# Patient Record
Sex: FEMALE | Race: WHITE | NOT HISPANIC OR LATINO | Employment: OTHER | URBAN - METROPOLITAN AREA
[De-identification: names, ages, dates, MRNs, and addresses within clinical notes are randomized per-mention and may not be internally consistent; named-entity substitution may affect disease eponyms.]

---

## 2023-08-30 DIAGNOSIS — I10 ESSENTIAL (PRIMARY) HYPERTENSION: ICD-10-CM

## 2023-08-30 RX ORDER — METOPROLOL SUCCINATE 50 MG/1
50 TABLET, EXTENDED RELEASE ORAL DAILY
Qty: 90 TABLET | Refills: 0 | Status: SHIPPED | OUTPATIENT
Start: 2023-08-30 | End: 2023-09-15 | Stop reason: SDUPTHER

## 2023-09-15 DIAGNOSIS — I10 ESSENTIAL (PRIMARY) HYPERTENSION: ICD-10-CM

## 2023-09-15 DIAGNOSIS — K21.9 GASTROESOPHAGEAL REFLUX DISEASE, UNSPECIFIED WHETHER ESOPHAGITIS PRESENT: ICD-10-CM

## 2023-09-15 RX ORDER — OMEPRAZOLE 40 MG/1
1 CAPSULE, DELAYED RELEASE ORAL DAILY
COMMUNITY
Start: 2018-02-23 | End: 2023-09-15 | Stop reason: SDUPTHER

## 2023-09-18 DIAGNOSIS — E55.9 VITAMIN D DEFICIENCY: ICD-10-CM

## 2023-09-18 DIAGNOSIS — E78.5 DYSLIPIDEMIA, GOAL LDL BELOW 130: Primary | ICD-10-CM

## 2023-09-18 RX ORDER — OMEPRAZOLE 40 MG/1
40 CAPSULE, DELAYED RELEASE ORAL DAILY
Qty: 90 CAPSULE | Refills: 0 | Status: SHIPPED | OUTPATIENT
Start: 2023-09-18 | End: 2024-01-08

## 2023-09-18 RX ORDER — METOPROLOL SUCCINATE 50 MG/1
50 TABLET, EXTENDED RELEASE ORAL DAILY
Qty: 90 TABLET | Refills: 0 | Status: SHIPPED | OUTPATIENT
Start: 2023-09-18 | End: 2024-04-08

## 2023-10-24 DIAGNOSIS — R92.8 ABNORMAL MAMMOGRAM: ICD-10-CM

## 2023-10-28 ENCOUNTER — LAB (OUTPATIENT)
Dept: LAB | Facility: LAB | Age: 78
End: 2023-10-28
Payer: MEDICARE

## 2023-10-28 DIAGNOSIS — E78.5 DYSLIPIDEMIA, GOAL LDL BELOW 130: ICD-10-CM

## 2023-10-28 DIAGNOSIS — E55.9 VITAMIN D DEFICIENCY: ICD-10-CM

## 2023-10-28 LAB
25(OH)D3 SERPL-MCNC: 35 NG/ML (ref 30–100)
ALBUMIN SERPL BCP-MCNC: 4.2 G/DL (ref 3.4–5)
ALP SERPL-CCNC: 73 U/L (ref 33–136)
ALT SERPL W P-5'-P-CCNC: 10 U/L (ref 7–45)
ANION GAP SERPL CALC-SCNC: 12 MMOL/L (ref 10–20)
AST SERPL W P-5'-P-CCNC: 16 U/L (ref 9–39)
BASOPHILS # BLD AUTO: 0.05 X10*3/UL (ref 0–0.1)
BASOPHILS NFR BLD AUTO: 1.3 %
BILIRUB SERPL-MCNC: 0.7 MG/DL (ref 0–1.2)
BUN SERPL-MCNC: 14 MG/DL (ref 6–23)
CALCIUM SERPL-MCNC: 9.4 MG/DL (ref 8.6–10.3)
CHLORIDE SERPL-SCNC: 102 MMOL/L (ref 98–107)
CHOLEST SERPL-MCNC: 205 MG/DL (ref 0–199)
CHOLESTEROL/HDL RATIO: 3
CO2 SERPL-SCNC: 28 MMOL/L (ref 21–32)
CREAT SERPL-MCNC: 0.76 MG/DL (ref 0.5–1.05)
EOSINOPHIL # BLD AUTO: 0.24 X10*3/UL (ref 0–0.4)
EOSINOPHIL NFR BLD AUTO: 6.3 %
ERYTHROCYTE [DISTWIDTH] IN BLOOD BY AUTOMATED COUNT: 12.8 % (ref 11.5–14.5)
GFR SERPL CREATININE-BSD FRML MDRD: 80 ML/MIN/1.73M*2
GLUCOSE SERPL-MCNC: 92 MG/DL (ref 74–99)
HCT VFR BLD AUTO: 42.2 % (ref 36–46)
HDLC SERPL-MCNC: 67.9 MG/DL
HGB BLD-MCNC: 13.8 G/DL (ref 12–16)
IMM GRANULOCYTES # BLD AUTO: 0.01 X10*3/UL (ref 0–0.5)
IMM GRANULOCYTES NFR BLD AUTO: 0.3 % (ref 0–0.9)
LDLC SERPL CALC-MCNC: 119 MG/DL
LYMPHOCYTES # BLD AUTO: 1.36 X10*3/UL (ref 0.8–3)
LYMPHOCYTES NFR BLD AUTO: 35.7 %
MCH RBC QN AUTO: 28.9 PG (ref 26–34)
MCHC RBC AUTO-ENTMCNC: 32.7 G/DL (ref 32–36)
MCV RBC AUTO: 89 FL (ref 80–100)
MONOCYTES # BLD AUTO: 0.42 X10*3/UL (ref 0.05–0.8)
MONOCYTES NFR BLD AUTO: 11 %
NEUTROPHILS # BLD AUTO: 1.73 X10*3/UL (ref 1.6–5.5)
NEUTROPHILS NFR BLD AUTO: 45.4 %
NON HDL CHOLESTEROL: 137 MG/DL (ref 0–149)
NRBC BLD-RTO: 0 /100 WBCS (ref 0–0)
PLATELET # BLD AUTO: 178 X10*3/UL (ref 150–450)
PMV BLD AUTO: 12 FL (ref 7.5–11.5)
POTASSIUM SERPL-SCNC: 4.2 MMOL/L (ref 3.5–5.3)
PROT SERPL-MCNC: 6.6 G/DL (ref 6.4–8.2)
RBC # BLD AUTO: 4.77 X10*6/UL (ref 4–5.2)
SODIUM SERPL-SCNC: 138 MMOL/L (ref 136–145)
TRIGL SERPL-MCNC: 93 MG/DL (ref 0–149)
TSH SERPL-ACNC: 1.33 MIU/L (ref 0.44–3.98)
VLDL: 19 MG/DL (ref 0–40)
WBC # BLD AUTO: 3.8 X10*3/UL (ref 4.4–11.3)

## 2023-10-28 PROCEDURE — 80053 COMPREHEN METABOLIC PANEL: CPT

## 2023-10-28 PROCEDURE — 36415 COLL VENOUS BLD VENIPUNCTURE: CPT

## 2023-10-28 PROCEDURE — 84443 ASSAY THYROID STIM HORMONE: CPT

## 2023-10-28 PROCEDURE — 80061 LIPID PANEL: CPT

## 2023-10-28 PROCEDURE — 82306 VITAMIN D 25 HYDROXY: CPT

## 2023-10-28 PROCEDURE — 85025 COMPLETE CBC W/AUTO DIFF WBC: CPT

## 2023-11-08 ENCOUNTER — HOSPITAL ENCOUNTER (EMERGENCY)
Facility: HOSPITAL | Age: 78
Discharge: HOME | End: 2023-11-08
Attending: EMERGENCY MEDICINE
Payer: MEDICARE

## 2023-11-08 ENCOUNTER — OFFICE VISIT (OUTPATIENT)
Dept: PRIMARY CARE | Facility: CLINIC | Age: 78
End: 2023-11-08
Payer: MEDICARE

## 2023-11-08 VITALS
DIASTOLIC BLOOD PRESSURE: 82 MMHG | RESPIRATION RATE: 18 BRPM | TEMPERATURE: 97 F | WEIGHT: 190.92 LBS | BODY MASS INDEX: 32.59 KG/M2 | HEART RATE: 93 BPM | SYSTOLIC BLOOD PRESSURE: 154 MMHG | HEIGHT: 64 IN | OXYGEN SATURATION: 97 %

## 2023-11-08 VITALS
BODY MASS INDEX: 32.61 KG/M2 | WEIGHT: 191 LBS | HEART RATE: 68 BPM | DIASTOLIC BLOOD PRESSURE: 71 MMHG | SYSTOLIC BLOOD PRESSURE: 134 MMHG | HEIGHT: 64 IN

## 2023-11-08 DIAGNOSIS — Z17.0 MALIGNANT NEOPLASM OF BREAST IN FEMALE, ESTROGEN RECEPTOR POSITIVE, UNSPECIFIED LATERALITY, UNSPECIFIED SITE OF BREAST (MULTI): ICD-10-CM

## 2023-11-08 DIAGNOSIS — R32 URINARY INCONTINENCE, UNSPECIFIED TYPE: ICD-10-CM

## 2023-11-08 DIAGNOSIS — M18.0 PRIMARY OSTEOARTHRITIS OF BOTH FIRST CARPOMETACARPAL JOINTS: ICD-10-CM

## 2023-11-08 DIAGNOSIS — C50.919 MALIGNANT NEOPLASM OF BREAST IN FEMALE, ESTROGEN RECEPTOR POSITIVE, UNSPECIFIED LATERALITY, UNSPECIFIED SITE OF BREAST (MULTI): ICD-10-CM

## 2023-11-08 DIAGNOSIS — I10 BENIGN ESSENTIAL HYPERTENSION: ICD-10-CM

## 2023-11-08 DIAGNOSIS — Z00.00 ROUTINE GENERAL MEDICAL EXAMINATION AT HEALTH CARE FACILITY: Primary | ICD-10-CM

## 2023-11-08 DIAGNOSIS — T78.40XA ALLERGIC REACTION, INITIAL ENCOUNTER: Primary | ICD-10-CM

## 2023-11-08 PROBLEM — R13.19 ESOPHAGEAL DYSPHAGIA: Status: ACTIVE | Noted: 2023-11-08

## 2023-11-08 PROBLEM — G89.29 CHRONIC RIGHT SHOULDER PAIN: Status: ACTIVE | Noted: 2023-11-08

## 2023-11-08 PROBLEM — Z97.4 WEARS HEARING AID: Status: ACTIVE | Noted: 2019-02-18

## 2023-11-08 PROBLEM — S83.242A ACUTE MEDIAL MENISCUS TEAR OF LEFT KNEE: Status: ACTIVE | Noted: 2023-11-08

## 2023-11-08 PROBLEM — D47.02 SYSTEMIC MASTOCYTOSIS: Status: ACTIVE | Noted: 2023-06-09

## 2023-11-08 PROBLEM — H90.A31 MIXED CONDUCTIVE AND SENSORINEURAL HEARING LOSS OF RIGHT EAR WITH RESTRICTED HEARING OF LEFT EAR: Status: ACTIVE | Noted: 2023-11-08

## 2023-11-08 PROBLEM — H93.A2 PULSATILE TINNITUS, LEFT EAR: Status: ACTIVE | Noted: 2019-02-19

## 2023-11-08 PROBLEM — R91.1 LUNG NODULE: Status: ACTIVE | Noted: 2023-01-07

## 2023-11-08 PROBLEM — G80.9 CEREBRAL PALSY (MULTI): Status: ACTIVE | Noted: 2023-06-09

## 2023-11-08 PROBLEM — G43.109 OCULAR MIGRAINE: Status: ACTIVE | Noted: 2017-03-16

## 2023-11-08 PROBLEM — R42 VERTIGO: Status: ACTIVE | Noted: 2023-11-08

## 2023-11-08 PROBLEM — M54.12 RADICULITIS, CERVICAL: Status: ACTIVE | Noted: 2023-11-08

## 2023-11-08 PROBLEM — Z85.3 HISTORY OF MALIGNANT NEOPLASM OF BREAST: Status: ACTIVE | Noted: 2023-04-05

## 2023-11-08 PROBLEM — H90.3 SENSORINEURAL HEARING LOSS (SNHL) OF BOTH EARS: Status: ACTIVE | Noted: 2019-02-18

## 2023-11-08 PROBLEM — M65.341 TRIGGER FINGER, RIGHT RING FINGER: Status: ACTIVE | Noted: 2023-11-08

## 2023-11-08 PROBLEM — M19.031 PRIMARY OSTEOARTHRITIS, RIGHT WRIST: Status: ACTIVE | Noted: 2023-11-08

## 2023-11-08 PROBLEM — N39.3 STRESS INCONTINENCE, FEMALE: Status: ACTIVE | Noted: 2023-11-08

## 2023-11-08 PROBLEM — M65.311 TRIGGER THUMB, RIGHT THUMB: Status: ACTIVE | Noted: 2023-11-08

## 2023-11-08 PROBLEM — K21.9 GASTROESOPHAGEAL REFLUX DISEASE WITHOUT ESOPHAGITIS: Status: ACTIVE | Noted: 2019-12-09

## 2023-11-08 PROBLEM — M65.30 TRIGGER FINGER, ACQUIRED: Status: ACTIVE | Noted: 2023-11-08

## 2023-11-08 PROBLEM — H61.20 CERUMEN IN AUDITORY CANAL ON EXAMINATION: Status: ACTIVE | Noted: 2023-11-08

## 2023-11-08 PROBLEM — H69.92 DYSFUNCTION OF LEFT EUSTACHIAN TUBE: Status: ACTIVE | Noted: 2023-11-08

## 2023-11-08 PROBLEM — R73.01 IMPAIRED FASTING GLUCOSE: Status: ACTIVE | Noted: 2023-06-09

## 2023-11-08 PROBLEM — H93.8X2 BLOCKED EAR, LEFT: Status: ACTIVE | Noted: 2019-02-18

## 2023-11-08 PROBLEM — R06.02 SHORTNESS OF BREATH: Status: ACTIVE | Noted: 2023-11-08

## 2023-11-08 PROBLEM — E78.5 HYPERLIPIDEMIA: Status: ACTIVE | Noted: 2023-11-08

## 2023-11-08 PROBLEM — E55.9 VITAMIN D DEFICIENCY: Status: ACTIVE | Noted: 2023-11-08

## 2023-11-08 PROBLEM — M65.312 TRIGGER THUMB, LEFT THUMB: Status: ACTIVE | Noted: 2023-11-08

## 2023-11-08 PROBLEM — M17.12 ARTHRITIS OF LEFT KNEE: Status: ACTIVE | Noted: 2023-11-08

## 2023-11-08 PROBLEM — M25.562 LEFT KNEE PAIN: Status: ACTIVE | Noted: 2023-11-08

## 2023-11-08 PROBLEM — T78.00XA ANAPHYLAXIS DUE TO FOOD: Status: ACTIVE | Noted: 2023-11-08

## 2023-11-08 PROBLEM — H10.9 CONJUNCTIVITIS: Status: ACTIVE | Noted: 2023-11-08

## 2023-11-08 PROBLEM — J45.909 ASTHMATIC BRONCHITIS (HHS-HCC): Status: ACTIVE | Noted: 2023-11-08

## 2023-11-08 PROBLEM — G80.9 CEREBRAL PALSY (MULTI): Status: RESOLVED | Noted: 2023-06-09 | Resolved: 2023-11-08

## 2023-11-08 PROBLEM — R91.8 MASS OF LINGULA OF LUNG: Status: ACTIVE | Noted: 2023-11-08

## 2023-11-08 PROBLEM — R13.10 DYSPHAGIA: Status: ACTIVE | Noted: 2023-01-07

## 2023-11-08 PROBLEM — M25.511 CHRONIC RIGHT SHOULDER PAIN: Status: ACTIVE | Noted: 2023-11-08

## 2023-11-08 PROBLEM — R92.0 ABNORMAL MAMMOGRAM WITH MICROCALCIFICATION: Status: ACTIVE | Noted: 2023-11-08

## 2023-11-08 PROBLEM — R09.81 SINUS CONGESTION: Status: ACTIVE | Noted: 2019-04-24

## 2023-11-08 PROBLEM — D05.12 DUCTAL CARCINOMA IN SITU (DCIS) OF LEFT BREAST: Status: ACTIVE | Noted: 2023-11-08

## 2023-11-08 PROBLEM — M81.0 AGE RELATED OSTEOPOROSIS: Status: ACTIVE | Noted: 2023-11-08

## 2023-11-08 PROCEDURE — G0439 PPPS, SUBSEQ VISIT: HCPCS | Performed by: INTERNAL MEDICINE

## 2023-11-08 PROCEDURE — 96375 TX/PRO/DX INJ NEW DRUG ADDON: CPT

## 2023-11-08 PROCEDURE — 3078F DIAST BP <80 MM HG: CPT | Performed by: INTERNAL MEDICINE

## 2023-11-08 PROCEDURE — 99284 EMERGENCY DEPT VISIT MOD MDM: CPT | Mod: 25

## 2023-11-08 PROCEDURE — 3075F SYST BP GE 130 - 139MM HG: CPT | Performed by: INTERNAL MEDICINE

## 2023-11-08 PROCEDURE — 1159F MED LIST DOCD IN RCRD: CPT | Performed by: INTERNAL MEDICINE

## 2023-11-08 PROCEDURE — 96372 THER/PROPH/DIAG INJ SC/IM: CPT | Mod: 59

## 2023-11-08 PROCEDURE — 2500000004 HC RX 250 GENERAL PHARMACY W/ HCPCS (ALT 636 FOR OP/ED)

## 2023-11-08 PROCEDURE — 1036F TOBACCO NON-USER: CPT | Performed by: INTERNAL MEDICINE

## 2023-11-08 PROCEDURE — 99285 EMERGENCY DEPT VISIT HI MDM: CPT | Performed by: EMERGENCY MEDICINE

## 2023-11-08 PROCEDURE — 1170F FXNL STATUS ASSESSED: CPT | Performed by: INTERNAL MEDICINE

## 2023-11-08 PROCEDURE — 99214 OFFICE O/P EST MOD 30 MIN: CPT | Performed by: INTERNAL MEDICINE

## 2023-11-08 PROCEDURE — 1160F RVW MEDS BY RX/DR IN RCRD: CPT | Performed by: INTERNAL MEDICINE

## 2023-11-08 PROCEDURE — 96374 THER/PROPH/DIAG INJ IV PUSH: CPT

## 2023-11-08 RX ORDER — DIPHENHYDRAMINE HYDROCHLORIDE 50 MG/ML
50 INJECTION INTRAMUSCULAR; INTRAVENOUS ONCE
Status: COMPLETED | OUTPATIENT
Start: 2023-11-08 | End: 2023-11-08

## 2023-11-08 RX ORDER — MULTIVITAMIN
1 TABLET ORAL NIGHTLY
COMMUNITY
Start: 2012-02-23

## 2023-11-08 RX ORDER — EPINEPHRINE 1 MG/ML
0.3 INJECTION INTRAMUSCULAR; INTRAVENOUS; SUBCUTANEOUS ONCE
Status: COMPLETED | OUTPATIENT
Start: 2023-11-08 | End: 2023-11-08

## 2023-11-08 RX ORDER — FAMOTIDINE 10 MG/ML
20 INJECTION INTRAVENOUS ONCE
Status: COMPLETED | OUTPATIENT
Start: 2023-11-08 | End: 2023-11-08

## 2023-11-08 RX ADMIN — DIPHENHYDRAMINE HYDROCHLORIDE 50 MG: 50 INJECTION, SOLUTION INTRAMUSCULAR; INTRAVENOUS at 17:17

## 2023-11-08 RX ADMIN — EPINEPHRINE 0.3 MG: 1 INJECTION INTRAMUSCULAR; INTRAVENOUS; SUBCUTANEOUS at 17:18

## 2023-11-08 RX ADMIN — FAMOTIDINE 20 MG: 10 INJECTION, SOLUTION INTRAVENOUS at 17:15

## 2023-11-08 RX ADMIN — METHYLPREDNISOLONE SODIUM SUCCINATE 125 MG: 125 INJECTION, POWDER, FOR SOLUTION INTRAMUSCULAR; INTRAVENOUS at 17:16

## 2023-11-08 ASSESSMENT — ACTIVITIES OF DAILY LIVING (ADL)
DOING_HOUSEWORK: INDEPENDENT
BATHING: INDEPENDENT
MANAGING_FINANCES: INDEPENDENT
DRESSING: INDEPENDENT
GROCERY_SHOPPING: INDEPENDENT
TAKING_MEDICATION: INDEPENDENT

## 2023-11-08 ASSESSMENT — LIFESTYLE VARIABLES
REASON UNABLE TO ASSESS: NO
HAVE PEOPLE ANNOYED YOU BY CRITICIZING YOUR DRINKING: NO
EVER HAD A DRINK FIRST THING IN THE MORNING TO STEADY YOUR NERVES TO GET RID OF A HANGOVER: NO
EVER FELT BAD OR GUILTY ABOUT YOUR DRINKING: NO
HAVE YOU EVER FELT YOU SHOULD CUT DOWN ON YOUR DRINKING: NO

## 2023-11-08 ASSESSMENT — COLUMBIA-SUICIDE SEVERITY RATING SCALE - C-SSRS
2. HAVE YOU ACTUALLY HAD ANY THOUGHTS OF KILLING YOURSELF?: NO
6. HAVE YOU EVER DONE ANYTHING, STARTED TO DO ANYTHING, OR PREPARED TO DO ANYTHING TO END YOUR LIFE?: NO
1. IN THE PAST MONTH, HAVE YOU WISHED YOU WERE DEAD OR WISHED YOU COULD GO TO SLEEP AND NOT WAKE UP?: NO

## 2023-11-08 ASSESSMENT — PAIN - FUNCTIONAL ASSESSMENT: PAIN_FUNCTIONAL_ASSESSMENT: 0-10

## 2023-11-08 ASSESSMENT — PAIN SCALES - GENERAL: PAINLEVEL_OUTOF10: 0 - NO PAIN

## 2023-11-08 NOTE — PROGRESS NOTES
"Subjective   Reason for Visit: Le Granados is an 78 y.o. female here for a Medicare Wellness visit.     Past Medical, Surgical, and Family History reviewed and updated in chart.    Reviewed all medications by prescribing practitioner or clinical pharmacist (such as prescriptions, OTCs, herbal therapies and supplements) and documented in the medical record.    She golfs 5 days almost always,   No cp or presssure  She also line dances and rides her stationary bike  No LH or palps  Bowels are regular, takes fiber most days  Her urine, she can golf 9 holes and not urinate but when she sits at home, she gets incontinence, but as soon as she stands up, it will run out of her.   She can play pineXelate for 1 hour and stand up and not urinate  She tried getting up every hour and urinate and that was a pain.  Some days is every urination she leaks and only occurs at home            Patient Care Team:  Norma Rogel, DO as PCP - General     Review of Systems    Objective   Vitals:  /71   Pulse 68   Ht 1.626 m (5' 4\")   Wt 86.6 kg (191 lb)   BMI 32.79 kg/m²       Physical Exam  Constitutional:       Appearance: Normal appearance.   HENT:      Head: Normocephalic and atraumatic.      Nose: Congestion present.      Comments: Turbs pale and edeamtous  Neck:      Vascular: No carotid bruit.   Cardiovascular:      Rate and Rhythm: Normal rate and regular rhythm.      Pulses: Normal pulses.      Heart sounds: No murmur heard.  Pulmonary:      Breath sounds: Normal breath sounds.   Abdominal:      Palpations: There is no mass.      Tenderness: There is abdominal tenderness (mild b/l LQ tenderness, no mass, no guarding or rebound).   Musculoskeletal:      Right lower leg: No edema.      Left lower leg: No edema.   Lymphadenopathy:      Cervical: No cervical adenopathy.   Skin:     Coloration: Skin is not jaundiced or pale.   Neurological:      Mental Status: She is alert and oriented to person, place, and time. " "  Psychiatric:         Mood and Affect: Mood normal.         Assessment/Plan   Problem List Items Addressed This Visit    None  Visit Diagnoses       Routine general medical examination at health care facility    -  Primary            Patient Instructions   Bilateral thumb pain is from arthritis, call [phone] to schedule with Dr. Beltre, hand surgeon    Atypical incontinence, mostly on standing at home, cannot do vaginal estrogen, call [phone] to schedule with Dr. Clarke    Bp is well controlled    Labs all normal, discussed mildly low wbc with normal diff, no issues  Recheck annually    Breast cancer - due for 6 month recheck on mammogram/left  Since estrogen and progesterone receptor positive, consider oral estrogen blockers to decrease recurrence     I recommend that you get the shingrix shots. Shingles vaccination requires a 2 shots series divided by 2 to 6 months. Get at the pharmacy. Ask the pharmacist \"how much\" prior to injected due cost varies based on your insurance. Shingrix can make you feel tired and achy for a few days after so do not get it prior to a big event . Is free now    Declines flu and covid booster    Allergies, continue zyrtec and consider adding OTC nasonex since having a flare    Bone density due in 2031, normal in 2021  Colonscopy normal in 2018, start annual stool test/FIT test in 2028    Recheck in 1 year and as needed          "

## 2023-11-08 NOTE — PROGRESS NOTES
Subjective   Patient ID: Le Granados is a 78 y.o. female who presents for Medicare Annual Wellness Visit Subsequent.    HPI     Review of Systems    Objective   There were no vitals taken for this visit.    Physical Exam    Assessment/Plan

## 2023-11-08 NOTE — ED TRIAGE NOTES
Patient c/o throat swelling and SOB after eating a cookie that possibly had nuts in it. Patient is allergic to nuts and shellfish. Did not take any meds PTA.

## 2023-11-08 NOTE — PATIENT INSTRUCTIONS
"Bilateral thumb pain is from arthritis, call [phone] to schedule with Dr. Beltre, hand surgeon    Atypical incontinence, mostly on standing at home, cannot do vaginal estrogen, call [phone] to schedule with Dr. Clarke    Bp is well controlled    Labs all normal, discussed mildly low wbc with normal diff, no issues  Recheck annually    Breast cancer - due for 6 month recheck on mammogram/left  Since estrogen and progesterone receptor positive, consider oral estrogen blockers to decrease recurrence     I recommend that you get the shingrix shots. Shingles vaccination requires a 2 shots series divided by 2 to 6 months. Get at the pharmacy. Ask the pharmacist \"how much\" prior to injected due cost varies based on your insurance. Shingrix can make you feel tired and achy for a few days after so do not get it prior to a big event . Is free now    Declines flu and covid booster    Allergies, continue zyrtec and consider adding OTC nasonex since having a flare    Bone density due in 2031, normal in 2021  Colonscopy normal in 2018, start annual stool test/FIT test in 2028    Recheck in 1 year and as needed      "

## 2023-11-09 NOTE — ED PROVIDER NOTES
HPI   Chief Complaint   Patient presents with    Allergic Reaction       Patient is a 78-year-old female with significant PMH of hypertension presents to the ED with cc of allergic reaction x10 minutes prior to arrival.  Patient states she has an allergy to nuts and seafood where she will go into anaphylaxis.  Patient states she was given cookies that had nuts in them that she was unaware of.  Patient soon after began feeling short of breath and throat tightness.  Patient states she has needed to be admitted to the ICU for allergic reactions in the past.  Patient has not used any of her EpiPen's because she was not at home.  Patient does have EpiPen's at home however.  Patient has not had any medication before arrival.  Patient denies any history of asthma or COPD.  Patient states shortness of breath is constant and worse with exertion.  Patient denies any history of MI blood clots recent travel or surgery.                          Hazard Coma Scale Score: 15                  Patient History   Past Medical History:   Diagnosis Date    COVID-19 12/17/2020    COVID-19 virus infection    Encounter for other preprocedural examination 03/05/2021    Preop examination    Other conditions influencing health status 12/02/2020    History of cough    Personal history of malignant neoplasm of breast     History of malignant neoplasm of breast    Personal history of other diseases of the circulatory system     History of hypertension    Personal history of other diseases of the musculoskeletal system and connective tissue 09/21/2017    History of bone disorder    Personal history of other diseases of the nervous system and sense organs 03/05/2021    History of otitis media    Personal history of other diseases of the respiratory system     History of pneumothorax    Personal history of other specified conditions 02/23/2018    History of abdominal pain    Personal history of other specified conditions     History of chest pain      Past Surgical History:   Procedure Laterality Date    BREAST LUMPECTOMY  09/25/2017    Breast Surgery Lumpectomy    CARPAL TUNNEL RELEASE  09/25/2017    Neuroplasty Decompression Median Nerve At Carpal Tunnel    CATARACT EXTRACTION  09/25/2017    Cataract Extraction    CERVICAL FUSION  09/25/2017    Cervical Vertebral Fusion    HAND TENDON SURGERY  09/25/2017    Hand Incision Tendon Sheath Of A Finger    HYSTERECTOMY  09/25/2017    Hysterectomy    KNEE ARTHROSCOPY W/ DEBRIDEMENT  09/25/2017    Arthroscopy Knee Right    LUMBAR LAMINECTOMY  09/25/2017    Laminectomy Lumbar    OTHER SURGICAL HISTORY  09/25/2017    Oophorectomy - Bilateral (Removal Of Both Ovaries)    OTHER SURGICAL HISTORY  05/13/2021    Knee replacement    TYMPANOSTOMY TUBE PLACEMENT  09/25/2017    Ear Pressure Equalization Tube     Family History   Adopted: Yes   Problem Relation Name Age of Onset    No Known Problems Mother          adopted    No Known Problems Father          adopted     Social History     Tobacco Use    Smoking status: Never    Smokeless tobacco: Never   Substance Use Topics    Alcohol use: Never    Drug use: Never       Physical Exam   ED Triage Vitals [11/08/23 1704]   Temp Heart Rate Resp BP   36.1 °C (97 °F) 78 18 (!) 171/94      SpO2 Temp Source Heart Rate Source Patient Position   95 % Tympanic Monitor --      BP Location FiO2 (%)     -- --       Physical Exam  Constitutional:       Appearance: Normal appearance.   HENT:      Head: Normocephalic.      Mouth/Throat:      Pharynx: Oropharynx is clear. No oropharyngeal exudate or posterior oropharyngeal erythema.   Eyes:      Extraocular Movements: Extraocular movements intact.      Pupils: Pupils are equal, round, and reactive to light.   Cardiovascular:      Rate and Rhythm: Normal rate and regular rhythm.      Pulses: Normal pulses.      Heart sounds: Normal heart sounds.   Pulmonary:      Effort: Pulmonary effort is normal.      Breath sounds: No wheezing, rhonchi or  rales.   Abdominal:      Palpations: Abdomen is soft.      Tenderness: There is no abdominal tenderness. There is no guarding or rebound.   Musculoskeletal:         General: No tenderness. Normal range of motion.      Cervical back: Normal range of motion.   Neurological:      General: No focal deficit present.      Mental Status: She is alert and oriented to person, place, and time. Mental status is at baseline.   Psychiatric:         Mood and Affect: Mood normal.         ED Course & MDM   Diagnoses as of 11/08/23 1923   Allergic reaction, initial encounter       Medical Decision Making  Medical Decision Making:  Patient presented as described in HPI. Patient case including ROS, PE, and treatment and plan discussed with ED attending if attached as cosigner. Due to patients presentation orders completed include as documented.  Patient presents to the ED with cc of allergic reaction x10 minutes prior to arrival.  Patient states she believes she ingested nuts which she has had anaphylaxis to in the past.  Patient is endorsing shortness of breath and throat tightness.  Patient denies any history of asthma COPD.  Patient denies any history of MI blood clots recent travel or surgery.  Patient did not take any medications prior to arrival.  Is nontoxic-appearing, abdomen is soft and nontender, no rashes appreciated, lung sounds are clear bilaterally, pharynx is unremarkable.  Patient administered epi Solu-Medrol, Pepcid and Benadryl for symptoms.  Upon reevaluation patient endorses improvement in symptoms.  We will observe patient for 4 hours and if patient is still feeling well and symptoms have resolved she will be discharged home.  Patient wishes to go home after 3 hours and 30 minutes.  Patient educated on the risks.  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  Patient states she continues to feel well and wishes to go.  Patient educated on any worsening symptoms return.  Patient remained stable and  discharged home.  Patient was advised to follow up with PCP or recommended provider in 2-3 days for another evaluation and exam. I advised patient/guardian to return or go to closest emergency room immediately if symptoms change, get worse, new symptoms develop prior to follow up. If there is no improvement in symptoms in the next 24 hours they are advised to return for further evaluation and exam. I also explained the plan and treatment course. Patient/guardian is in agreement with plan, treatment course, and follow up and states verbally that they will comply.    EKG interpretation: Performed at 1725 normal sinus rhythm, normal axis, no acute signs of ischemia.  Ventricular rate 70 bpm    Patient care discussed with: N/A  Social Determinants affecting care: N/A    Final diagnosis and disposition as below.  See CI    Homegoing. I discussed the differential; results and discharge plan with the patient and/or family/friend/caregiver if present.  I emphasized the importance of follow-up with the physician I referred them to in the timeframe recommended.  I explained reasons for the patient to return to the Emergency Department. They agreed that if they feel their condition is worsening or if they have any other concern they should call 911 immediately for further assistance. I gave the patient an opportunity to ask all questions they had and answered all of them accordingly. They understand return precautions and discharge instructions. The patient and/or family/friend/caregiver expressed understanding verbally and that they would comply.       Disposition:  Discharge      This note has been transcribed using voice recognition and may contain grammatical errors, misplaced words, incorrect words, incorrect phrases or other errors.          Procedure  Procedures     Catrina Adorno PA-C  11/08/23 2008

## 2023-11-10 ENCOUNTER — APPOINTMENT (OUTPATIENT)
Dept: RADIATION ONCOLOGY | Facility: CLINIC | Age: 78
End: 2023-11-10
Payer: MEDICARE

## 2023-11-10 DIAGNOSIS — T78.2XXA ANAPHYLAXIS, INITIAL ENCOUNTER: ICD-10-CM

## 2023-11-10 NOTE — TELEPHONE ENCOUNTER
Requested Prescriptions     Pending Prescriptions Disp Refills    methylPREDNISolone (Medrol Dospak) 4 mg tablets 21 tablet 0     Sig: Take as directed on package.

## 2023-11-11 DIAGNOSIS — T78.40XS ALLERGIC REACTION, SEQUELA: Primary | ICD-10-CM

## 2023-11-11 RX ORDER — PREDNISONE 20 MG/1
40 TABLET ORAL DAILY
Qty: 10 TABLET | Refills: 0 | Status: SHIPPED | OUTPATIENT
Start: 2023-11-11 | End: 2023-11-16

## 2023-11-11 NOTE — PROGRESS NOTES
Called answering service for steroid to treat allergic reaction. [facility]

## 2023-11-13 RX ORDER — METHYLPREDNISOLONE 4 MG/1
TABLET ORAL
Qty: 21 TABLET | Refills: 0 | Status: SHIPPED | OUTPATIENT
Start: 2023-11-13 | End: 2023-11-17

## 2023-11-15 ENCOUNTER — HOSPITAL ENCOUNTER (OUTPATIENT)
Dept: RADIOLOGY | Facility: HOSPITAL | Age: 78
Discharge: HOME | End: 2023-11-15
Payer: MEDICARE

## 2023-11-15 DIAGNOSIS — R92.8 ABNORMAL MAMMOGRAM: ICD-10-CM

## 2023-11-15 PROCEDURE — 77065 DX MAMMO INCL CAD UNI: CPT | Mod: LT

## 2023-11-15 PROCEDURE — 77065 DX MAMMO INCL CAD UNI: CPT | Mod: LEFT SIDE | Performed by: RADIOLOGY

## 2023-11-27 ENCOUNTER — OFFICE VISIT (OUTPATIENT)
Dept: ORTHOPEDIC SURGERY | Facility: CLINIC | Age: 78
End: 2023-11-27
Payer: MEDICARE

## 2023-11-27 VITALS — WEIGHT: 190 LBS | BODY MASS INDEX: 32.44 KG/M2 | HEIGHT: 64 IN

## 2023-11-27 DIAGNOSIS — M18.0 PRIMARY OSTEOARTHRITIS OF BOTH FIRST CARPOMETACARPAL JOINTS: ICD-10-CM

## 2023-11-27 PROCEDURE — 99203 OFFICE O/P NEW LOW 30 MIN: CPT | Performed by: ORTHOPAEDIC SURGERY

## 2023-11-27 PROCEDURE — 1159F MED LIST DOCD IN RCRD: CPT | Performed by: ORTHOPAEDIC SURGERY

## 2023-11-27 PROCEDURE — 1160F RVW MEDS BY RX/DR IN RCRD: CPT | Performed by: ORTHOPAEDIC SURGERY

## 2023-11-27 PROCEDURE — 1036F TOBACCO NON-USER: CPT | Performed by: ORTHOPAEDIC SURGERY

## 2023-11-27 PROCEDURE — 1126F AMNT PAIN NOTED NONE PRSNT: CPT | Performed by: ORTHOPAEDIC SURGERY

## 2023-11-28 NOTE — PROGRESS NOTES
History of Present Illness:  Chief Complaint   Patient presents with    Left Hand - Pain     Left thumb trigger    Right Hand - Pain     Right thumb trigger       Patient presents today for evaluation of bilateral basilar thumb pain.  She does not have any locking or catching.  Pain is primarily about the base of both of her thumbs and has been worsening over the last 3 to 6 months.  No associated numbness or tingling.  Pain is worse with increased activities and somewhat better with rest.  Right side more symptomatic than left.      Past Medical History:   Diagnosis Date    COVID-19 12/17/2020    COVID-19 virus infection    Encounter for other preprocedural examination 03/05/2021    Preop examination    Other conditions influencing health status 12/02/2020    History of cough    Personal history of malignant neoplasm of breast     History of malignant neoplasm of breast    Personal history of other diseases of the circulatory system     History of hypertension    Personal history of other diseases of the musculoskeletal system and connective tissue 09/21/2017    History of bone disorder    Personal history of other diseases of the nervous system and sense organs 03/05/2021    History of otitis media    Personal history of other diseases of the respiratory system     History of pneumothorax    Personal history of other specified conditions 02/23/2018    History of abdominal pain    Personal history of other specified conditions     History of chest pain       Medication Documentation Review Audit       Reviewed by Viviana Graves MA (Medical Assistant) on 11/27/23 at 1025      Medication Order Taking? Sig Documenting Provider Last Dose Status   cetirizine HCl (ZYRTEC ORAL) [id number] No ZyrTEC Allergy Historical Provider, MD Taking Active   EPINEPHrine 0.3 mg/0.3 mL injection syringe [id number] No INJECT AS DIRECTED FOR ANAPHYLAXIS (AND CALL 911 IF OCCURS) Nomra Rogel,  Taking Active   metoprolol succinate XL  (Toprol-XL) 50 mg 24 hr tablet [id number] No Take 1 tablet (50 mg) by mouth once daily. Norma Salinasadryan, DO Taking Active   multivitamin tablet [id number] No Take 1 tablet by mouth once daily at bedtime. Historical Provider, MD Taking Active   omeprazole (PriLOSEC) 40 mg DR capsule [id number] No Take 1 capsule (40 mg) by mouth once daily. Norma Rogel, DO Taking Active                    Allergies   Allergen Reactions    Azithromycin Unknown, Anaphylaxis, Diarrhea and Other    Clindamycin Unknown, Anaphylaxis, Other and Itching    Fish Containing Products Anaphylaxis    Nut - Unspecified Anaphylaxis    Penicillins Anaphylaxis and Unknown    Tetracyclines Anaphylaxis, Swelling and Unknown     Severe facial  And tongue swelling    Tree Nuts Anaphylaxis and Unknown    Simvastatin Other and Unknown     Severe muscle pain    Sulfamethoxazole-Trimethoprim Other    Clarithromycin Rash, Unknown, Swelling and Other     rash       Social History     Socioeconomic History    Marital status:      Spouse name: Not on file    Number of children: Not on file    Years of education: Not on file    Highest education level: Not on file   Occupational History    Not on file   Tobacco Use    Smoking status: Never    Smokeless tobacco: Never   Substance and Sexual Activity    Alcohol use: Never    Drug use: Never    Sexual activity: Not on file   Other Topics Concern    Not on file   Social History Narrative    Not on file     Social Determinants of Health     Financial Resource Strain: Not on file   Food Insecurity: Not on file   Transportation Needs: Not on file   Physical Activity: Not on file   Stress: Not on file   Social Connections: Not on file   Intimate Partner Violence: Not on file   Housing Stability: Not on file       Past Surgical History:   Procedure Laterality Date    BREAST LUMPECTOMY  09/25/2017    Breast Surgery Lumpectomy    CARPAL TUNNEL RELEASE  09/25/2017    Neuroplasty Decompression Median Nerve At Carpal  Tunnel    CATARACT EXTRACTION  09/25/2017    Cataract Extraction    CERVICAL FUSION  09/25/2017    Cervical Vertebral Fusion    HAND TENDON SURGERY  09/25/2017    Hand Incision Tendon Sheath Of A Finger    HYSTERECTOMY  09/25/2017    Hysterectomy    KNEE ARTHROSCOPY W/ DEBRIDEMENT  09/25/2017    Arthroscopy Knee Right    LUMBAR LAMINECTOMY  09/25/2017    Laminectomy Lumbar    OTHER SURGICAL HISTORY  09/25/2017    Oophorectomy - Bilateral (Removal Of Both Ovaries)    OTHER SURGICAL HISTORY  05/13/2021    Knee replacement    TYMPANOSTOMY TUBE PLACEMENT  09/25/2017    Ear Pressure Equalization Tube        Review of Systems   GENERAL: Negative for malaise, significant weight loss, fever  MUSCULOSKELETAL: see HPI  NEURO:  Negative     Physical Examination  Constitutional: Appears well-developed and well-nourished.  Head: Normocephalic and atraumatic.  Eyes: EOMI grossly  Cardiovascular: Intact distal pulses.   Respiratory: Effort normal. No respiratory distress.  Neurologic: Alert and oriented to person, place, and time.  Skin: Skin is warm and dry.  Hematologic / Lymphatic: No lymphedema, lymphangitis.  Psychiatric: normal mood and affect. Behavior is normal.   Musculoskeletal:  Bilateral hands: Skin intact.  Normal rugal patterns.  No thenar or intrinsic wasting.  No tenderness about A1 pulleys.  Tenderness about thumb CMC joints with positive CMC grind.  No tenderness about first dorsal compartment.  Capillary refill less than 2 seconds all digits.  Negative Tinel's at level of carpal tunnel.  Negative Efrem/Phalen's.    Assessment:  Patient with bilateral thumb CMC arthritis     Plan: Treatments of thumb CMC arthritis were discussed with the patient.  This includes nonoperative treatment with symptomatic splinting, anti-inflammatories, corticosteroid injections and possible surgical intervention if conservative measures prove unsuccessful.  She would like to begin with bracing and will also try topical  anti-inflammatories.  She will follow-up if she wishes to pursue additional treatment options.

## 2023-12-07 ENCOUNTER — OFFICE VISIT (OUTPATIENT)
Dept: SURGERY | Facility: CLINIC | Age: 78
End: 2023-12-07
Payer: MEDICARE

## 2023-12-07 VITALS
WEIGHT: 192 LBS | SYSTOLIC BLOOD PRESSURE: 133 MMHG | BODY MASS INDEX: 34.02 KG/M2 | HEIGHT: 63 IN | OXYGEN SATURATION: 98 % | DIASTOLIC BLOOD PRESSURE: 70 MMHG | HEART RATE: 67 BPM | TEMPERATURE: 97.3 F

## 2023-12-07 DIAGNOSIS — D05.12 DUCTAL CARCINOMA IN SITU (DCIS) OF LEFT BREAST: Primary | ICD-10-CM

## 2023-12-07 PROCEDURE — 1036F TOBACCO NON-USER: CPT | Performed by: SURGERY

## 2023-12-07 PROCEDURE — 3075F SYST BP GE 130 - 139MM HG: CPT | Performed by: SURGERY

## 2023-12-07 PROCEDURE — 1160F RVW MEDS BY RX/DR IN RCRD: CPT | Performed by: SURGERY

## 2023-12-07 PROCEDURE — 1126F AMNT PAIN NOTED NONE PRSNT: CPT | Performed by: SURGERY

## 2023-12-07 PROCEDURE — 99213 OFFICE O/P EST LOW 20 MIN: CPT | Performed by: SURGERY

## 2023-12-07 PROCEDURE — 3078F DIAST BP <80 MM HG: CPT | Performed by: SURGERY

## 2023-12-07 PROCEDURE — 1159F MED LIST DOCD IN RCRD: CPT | Performed by: SURGERY

## 2023-12-07 ASSESSMENT — PAIN SCALES - GENERAL: PAINLEVEL: 0-NO PAIN

## 2023-12-08 NOTE — PROGRESS NOTES
Subjective   Patient ID: Le Granados is a 78 y.o. female who presents for Follow-up ([facility] - Breast exam ).  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

## 2024-01-08 DIAGNOSIS — K21.9 GASTROESOPHAGEAL REFLUX DISEASE, UNSPECIFIED WHETHER ESOPHAGITIS PRESENT: ICD-10-CM

## 2024-01-08 RX ORDER — OMEPRAZOLE 40 MG/1
40 CAPSULE, DELAYED RELEASE ORAL DAILY
Qty: 90 CAPSULE | Refills: 0 | Status: SHIPPED | OUTPATIENT
Start: 2024-01-08 | End: 2024-04-08

## 2024-02-23 ENCOUNTER — HOSPITAL ENCOUNTER (OUTPATIENT)
Dept: RADIATION ONCOLOGY | Facility: CLINIC | Age: 79
Setting detail: RADIATION/ONCOLOGY SERIES
Discharge: HOME | End: 2024-02-23
Payer: MEDICARE

## 2024-02-23 VITALS
DIASTOLIC BLOOD PRESSURE: 73 MMHG | WEIGHT: 192.46 LBS | TEMPERATURE: 97.3 F | RESPIRATION RATE: 18 BRPM | OXYGEN SATURATION: 98 % | BODY MASS INDEX: 34.09 KG/M2 | SYSTOLIC BLOOD PRESSURE: 149 MMHG | HEART RATE: 79 BPM

## 2024-02-23 DIAGNOSIS — D05.12 DUCTAL CARCINOMA IN SITU (DCIS) OF LEFT BREAST: Primary | ICD-10-CM

## 2024-02-23 PROCEDURE — 99213 OFFICE O/P EST LOW 20 MIN: CPT | Performed by: NURSE PRACTITIONER

## 2024-02-23 ASSESSMENT — PAIN SCALES - GENERAL: PAINLEVEL: 0-NO PAIN

## 2024-02-23 NOTE — PROGRESS NOTES
Radiation Oncology Follow-Up    Patient Name:  Le Granados  MRN:  [id number]  :  1945    Referring Provider: No ref. 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 ER/[place]     2021 through 2021: radiation therapy to the left breast consisting of a dose of 28.5 Gray/5.7 Gray per fraction/ given in 5 fractions. The radiation was delivered weekly.     Was recommended anastrozole post treatment however pt declined.      SUBJECTIVE  History of Present Illness:   Le Granados is here today for follow up s/p RT for left sided breast cancer. Doing well with no new health issues. She was recommended to start Arimidex. Patient decided against this due to side effects. Endorses some tenderness on the left breast. Denies difficulties with ROM or swelling of left arm. She follows her surgeon Dr. Parisi with mammograms at [facility]. Denies headaches, fever, chills, cough, SOB, chest pain, N/V or bony pain. She is very active and plays golf on 2 leagues. She had a detached retina left eye several months ago.  Mild left eye vision loss.     Review of Systems:    Review of Systems   All other systems reviewed and are negative.    Performance Status:   The Karnofsky performance scale today is 100, Fully active, able to carry on all pre-disease performed without restriction (ECOG equivalent 0).      OBJECTIVE  Vital Signs:  There were no vitals taken for this visit.     Current Outpatient Medications:     cetirizine HCl (ZYRTEC ORAL), ZyrTEC Allergy, Disp: , Rfl:      EPINEPHrine 0.3 mg/0.3 mL injection syringe, INJECT AS DIRECTED FOR ANAPHYLAXIS (AND CALL 911 IF OCCURS), Disp: 2 each, Rfl: 1    metoprolol succinate XL (Toprol-XL) 50 mg 24 hr tablet, Take 1 tablet (50 mg) by mouth once daily., Disp: 90 tablet, Rfl: 0    multivitamin tablet, Take 1 tablet by mouth once daily at bedtime., Disp: , Rfl:     omeprazole (PriLOSEC) 40 mg DR capsule, TAKE 1 CAPSULE BY MOUTH ONCE DAILY, Disp: 90 capsule, Rfl: 0     Physical Exam  Constitutional:       Appearance: Normal appearance.   HENT:      Head: Normocephalic and atraumatic.      Nose: Nose normal.      Mouth/Throat:      Mouth: Mucous membranes are moist.      Pharynx: Oropharynx is clear.   Eyes:      Conjunctiva/sclera: Conjunctivae normal.      Pupils: Pupils are equal, round, and reactive to light.   Cardiovascular:      Rate and Rhythm: Normal rate and regular rhythm.      Heart sounds: Normal heart sounds.   Pulmonary:      Effort: Pulmonary effort is normal.   Chest:   Breasts:     Right: Normal. No swelling, inverted nipple, mass or nipple discharge.      Left: Normal. No swelling, inverted nipple, mass or nipple discharge.          Comments: Left breast with well healed surgical incision. Mild fibrotic skin changes medial portion of left breast.   Abdominal:      Palpations: Abdomen is soft.   Musculoskeletal:         General: No swelling. Normal range of motion.      Cervical back: Normal range of motion and neck supple.   Lymphadenopathy:      Cervical: No cervical adenopathy.      Upper Body:      Right upper body: No supraclavicular or axillary adenopathy.      Left upper body: No supraclavicular or axillary adenopathy.   Skin:     General: Skin is warm and dry.   Neurological:      General: No focal deficit present.      Mental Status: She is alert and oriented to person, place, and time.   Psychiatric:         Mood and Affect: Mood normal.         Behavior: Behavior normal.         RESULTS:  Narrative & Impression    Interpreted By:  Lou Oneill,   STUDY:  BI MAMMO LEFT DIAGNOSTIC;  11/15/2023 9:56 am      ACCESSION NUMBER(S):  [id number]      ORDERING CLINICIAN:  FRANKLIN CHEN      INDICATION:  Signs/Symptoms:Abnormal Gurvinder April 2023.          COMPARISON:  04/12/2023      FINDINGS:  Density:  The breast tissue is extremely dense, which may limit the  sensitivity of mammography.      Stable scattered diffuse benign calcifications similar to prior.  Postoperative and postradiation changes in the left breast. No  suspicious masses or calcifications are identified. No significant  interval changes since prior.      IMPRESSION:  No mammographic evidence of malignancy.  Recommend return for routine  yearly screening bilateral mammograms or sooner if clinically  warranted.      BI-RADS CATEGORY:      BI-RADS Category:  2 Benign.  Recommendation:  Routine Screening Mammogram in 1 Year.  Recommended Date:  1 Year.  Laterality:  Left.     ASSESSMENT/PLAN:  78 y.o. female s/p radiation for breast cancer. Continue follow up with Dr. Chen at [facility] with mammograms. Patient to return to clinic in 12 months unless needs to be seen sooner. Instructed to call with any questions or concerns.     Vanessa Malloy CNP  [phone]

## 2024-03-15 DIAGNOSIS — Z85.3 HISTORY OF MALIGNANT NEOPLASM OF BREAST: Primary | ICD-10-CM

## 2024-04-04 ENCOUNTER — APPOINTMENT (OUTPATIENT)
Dept: RADIOLOGY | Facility: HOSPITAL | Age: 79
End: 2024-04-04
Payer: MEDICARE

## 2024-04-07 DIAGNOSIS — I10 ESSENTIAL (PRIMARY) HYPERTENSION: ICD-10-CM

## 2024-04-07 DIAGNOSIS — K21.9 GASTROESOPHAGEAL REFLUX DISEASE, UNSPECIFIED WHETHER ESOPHAGITIS PRESENT: ICD-10-CM

## 2024-04-08 RX ORDER — METOPROLOL SUCCINATE 50 MG/1
50 TABLET, EXTENDED RELEASE ORAL DAILY
Qty: 90 TABLET | Refills: 1 | Status: SHIPPED | OUTPATIENT
Start: 2024-04-08

## 2024-04-08 RX ORDER — OMEPRAZOLE 40 MG/1
40 CAPSULE, DELAYED RELEASE ORAL DAILY
Qty: 90 CAPSULE | Refills: 1 | Status: SHIPPED | OUTPATIENT
Start: 2024-04-08

## 2024-04-10 ENCOUNTER — HOSPITAL ENCOUNTER (OUTPATIENT)
Dept: RADIOLOGY | Facility: HOSPITAL | Age: 79
Discharge: HOME | End: 2024-04-10
Payer: MEDICARE

## 2024-04-10 VITALS — WEIGHT: 185 LBS | HEIGHT: 63 IN | BODY MASS INDEX: 32.78 KG/M2

## 2024-04-10 DIAGNOSIS — Z85.3 HISTORY OF MALIGNANT NEOPLASM OF BREAST: ICD-10-CM

## 2024-04-10 PROCEDURE — 77066 DX MAMMO INCL CAD BI: CPT | Performed by: RADIOLOGY

## 2024-04-10 PROCEDURE — G0279 TOMOSYNTHESIS, MAMMO: HCPCS | Performed by: RADIOLOGY

## 2024-04-10 PROCEDURE — 77062 BREAST TOMOSYNTHESIS BI: CPT

## 2024-05-29 ENCOUNTER — APPOINTMENT (OUTPATIENT)
Dept: CARDIOLOGY | Facility: HOSPITAL | Age: 79
End: 2024-05-29
Payer: MEDICARE

## 2024-05-29 ENCOUNTER — APPOINTMENT (OUTPATIENT)
Dept: RADIOLOGY | Facility: HOSPITAL | Age: 79
End: 2024-05-29
Payer: MEDICARE

## 2024-05-29 ENCOUNTER — HOSPITAL ENCOUNTER (OUTPATIENT)
Facility: HOSPITAL | Age: 79
Setting detail: OBSERVATION
Discharge: HOME | End: 2024-05-30
Attending: STUDENT IN AN ORGANIZED HEALTH CARE EDUCATION/TRAINING PROGRAM | Admitting: STUDENT IN AN ORGANIZED HEALTH CARE EDUCATION/TRAINING PROGRAM
Payer: MEDICARE

## 2024-05-29 DIAGNOSIS — G45.8 OTHER TRANSIENT CEREBRAL ISCHEMIC ATTACKS AND RELATED SYNDROMES: ICD-10-CM

## 2024-05-29 DIAGNOSIS — R42 DIZZINESS: Primary | ICD-10-CM

## 2024-05-29 DIAGNOSIS — G45.9 TIA (TRANSIENT ISCHEMIC ATTACK): ICD-10-CM

## 2024-05-29 LAB
ALBUMIN SERPL BCP-MCNC: 4.3 G/DL (ref 3.4–5)
ALP SERPL-CCNC: 74 U/L (ref 33–136)
ALT SERPL W P-5'-P-CCNC: 10 U/L (ref 7–45)
ANION GAP SERPL CALC-SCNC: 13 MMOL/L (ref 10–20)
AORTIC VALVE MEAN GRADIENT: 6 MMHG
AORTIC VALVE PEAK VELOCITY: 1.67 M/S
AST SERPL W P-5'-P-CCNC: 16 U/L (ref 9–39)
AV PEAK GRADIENT: 11.2 MMHG
AVA (PEAK VEL): 2.01 CM2
AVA (VTI): 2.09 CM2
BASOPHILS # BLD AUTO: 0.08 X10*3/UL (ref 0–0.1)
BASOPHILS NFR BLD AUTO: 1.3 %
BILIRUB SERPL-MCNC: 0.6 MG/DL (ref 0–1.2)
BNP SERPL-MCNC: 44 PG/ML (ref 0–99)
BUN SERPL-MCNC: 18 MG/DL (ref 6–23)
CALCIUM SERPL-MCNC: 9.4 MG/DL (ref 8.6–10.3)
CARDIAC TROPONIN I PNL SERPL HS: 3 NG/L (ref 0–13)
CARDIAC TROPONIN I PNL SERPL HS: <3 NG/L (ref 0–13)
CHLORIDE SERPL-SCNC: 100 MMOL/L (ref 98–107)
CO2 SERPL-SCNC: 26 MMOL/L (ref 21–32)
CREAT SERPL-MCNC: 0.7 MG/DL (ref 0.5–1.05)
EGFRCR SERPLBLD CKD-EPI 2021: 89 ML/MIN/1.73M*2
EJECTION FRACTION APICAL 4 CHAMBER: 63.8
EOSINOPHIL # BLD AUTO: 0.28 X10*3/UL (ref 0–0.4)
EOSINOPHIL NFR BLD AUTO: 4.4 %
ERYTHROCYTE [DISTWIDTH] IN BLOOD BY AUTOMATED COUNT: 12.6 % (ref 11.5–14.5)
GLUCOSE SERPL-MCNC: 146 MG/DL (ref 74–99)
HCT VFR BLD AUTO: 43.1 % (ref 36–46)
HGB BLD-MCNC: 14.3 G/DL (ref 12–16)
IMM GRANULOCYTES # BLD AUTO: 0.02 X10*3/UL (ref 0–0.5)
IMM GRANULOCYTES NFR BLD AUTO: 0.3 % (ref 0–0.9)
INR PPP: 1.1 (ref 0.9–1.1)
LACTATE SERPL-SCNC: 1.5 MMOL/L (ref 0.4–2)
LEFT ATRIUM VOLUME AREA LENGTH INDEX BSA: 24.4 ML/M2
LEFT VENTRICLE INTERNAL DIMENSION DIASTOLE: 3.74 CM (ref 3.5–6)
LEFT VENTRICULAR OUTFLOW TRACT DIAMETER: 2 CM
LV EJECTION FRACTION BIPLANE: 65 %
LYMPHOCYTES # BLD AUTO: 2.38 X10*3/UL (ref 0.8–3)
LYMPHOCYTES NFR BLD AUTO: 37.5 %
MAGNESIUM SERPL-MCNC: 1.86 MG/DL (ref 1.6–2.4)
MCH RBC QN AUTO: 28.6 PG (ref 26–34)
MCHC RBC AUTO-ENTMCNC: 33.2 G/DL (ref 32–36)
MCV RBC AUTO: 86 FL (ref 80–100)
MITRAL VALVE E/A RATIO: 0.74
MITRAL VALVE E/E' RATIO: 7.83
MONOCYTES # BLD AUTO: 0.63 X10*3/UL (ref 0.05–0.8)
MONOCYTES NFR BLD AUTO: 9.9 %
NEUTROPHILS # BLD AUTO: 2.95 X10*3/UL (ref 1.6–5.5)
NEUTROPHILS NFR BLD AUTO: 46.6 %
NRBC BLD-RTO: 0 /100 WBCS (ref 0–0)
PLATELET # BLD AUTO: 185 X10*3/UL (ref 150–450)
POTASSIUM SERPL-SCNC: 3.4 MMOL/L (ref 3.5–5.3)
PROT SERPL-MCNC: 7.1 G/DL (ref 6.4–8.2)
PROTHROMBIN TIME: 11.9 SECONDS (ref 9.8–12.8)
RBC # BLD AUTO: 5 X10*6/UL (ref 4–5.2)
RIGHT VENTRICLE FREE WALL PEAK S': 13.3 CM/S
RIGHT VENTRICLE PEAK SYSTOLIC PRESSURE: 30.8 MMHG
SODIUM SERPL-SCNC: 136 MMOL/L (ref 136–145)
TRICUSPID ANNULAR PLANE SYSTOLIC EXCURSION: 2.9 CM
WBC # BLD AUTO: 6.3 X10*3/UL (ref 4.4–11.3)

## 2024-05-29 PROCEDURE — 70547 MR ANGIOGRAPHY NECK W/O DYE: CPT

## 2024-05-29 PROCEDURE — 70450 CT HEAD/BRAIN W/O DYE: CPT

## 2024-05-29 PROCEDURE — 71045 X-RAY EXAM CHEST 1 VIEW: CPT

## 2024-05-29 PROCEDURE — 93005 ELECTROCARDIOGRAM TRACING: CPT

## 2024-05-29 PROCEDURE — 2500000001 HC RX 250 WO HCPCS SELF ADMINISTERED DRUGS (ALT 637 FOR MEDICARE OP): Performed by: STUDENT IN AN ORGANIZED HEALTH CARE EDUCATION/TRAINING PROGRAM

## 2024-05-29 PROCEDURE — 70551 MRI BRAIN STEM W/O DYE: CPT

## 2024-05-29 PROCEDURE — 96374 THER/PROPH/DIAG INJ IV PUSH: CPT

## 2024-05-29 PROCEDURE — G0378 HOSPITAL OBSERVATION PER HR: HCPCS

## 2024-05-29 PROCEDURE — 83036 HEMOGLOBIN GLYCOSYLATED A1C: CPT | Mod: GEALAB | Performed by: STUDENT IN AN ORGANIZED HEALTH CARE EDUCATION/TRAINING PROGRAM

## 2024-05-29 PROCEDURE — 70547 MR ANGIOGRAPHY NECK W/O DYE: CPT | Performed by: RADIOLOGY

## 2024-05-29 PROCEDURE — 99285 EMERGENCY DEPT VISIT HI MDM: CPT | Mod: 25

## 2024-05-29 PROCEDURE — 83735 ASSAY OF MAGNESIUM: CPT | Performed by: HEALTH CARE PROVIDER

## 2024-05-29 PROCEDURE — 84484 ASSAY OF TROPONIN QUANT: CPT | Performed by: HEALTH CARE PROVIDER

## 2024-05-29 PROCEDURE — 83605 ASSAY OF LACTIC ACID: CPT | Performed by: HEALTH CARE PROVIDER

## 2024-05-29 PROCEDURE — 85025 COMPLETE CBC W/AUTO DIFF WBC: CPT | Performed by: HEALTH CARE PROVIDER

## 2024-05-29 PROCEDURE — 85610 PROTHROMBIN TIME: CPT | Performed by: HEALTH CARE PROVIDER

## 2024-05-29 PROCEDURE — 93306 TTE W/DOPPLER COMPLETE: CPT

## 2024-05-29 PROCEDURE — 70544 MR ANGIOGRAPHY HEAD W/O DYE: CPT

## 2024-05-29 PROCEDURE — 71045 X-RAY EXAM CHEST 1 VIEW: CPT | Performed by: RADIOLOGY

## 2024-05-29 PROCEDURE — 80053 COMPREHEN METABOLIC PANEL: CPT | Performed by: HEALTH CARE PROVIDER

## 2024-05-29 PROCEDURE — 93306 TTE W/DOPPLER COMPLETE: CPT | Performed by: INTERNAL MEDICINE

## 2024-05-29 PROCEDURE — 70450 CT HEAD/BRAIN W/O DYE: CPT | Performed by: RADIOLOGY

## 2024-05-29 PROCEDURE — 70551 MRI BRAIN STEM W/O DYE: CPT | Performed by: RADIOLOGY

## 2024-05-29 PROCEDURE — 99223 1ST HOSP IP/OBS HIGH 75: CPT | Performed by: STUDENT IN AN ORGANIZED HEALTH CARE EDUCATION/TRAINING PROGRAM

## 2024-05-29 PROCEDURE — 96361 HYDRATE IV INFUSION ADD-ON: CPT

## 2024-05-29 PROCEDURE — 2500000004 HC RX 250 GENERAL PHARMACY W/ HCPCS (ALT 636 FOR OP/ED): Performed by: HEALTH CARE PROVIDER

## 2024-05-29 PROCEDURE — 36415 COLL VENOUS BLD VENIPUNCTURE: CPT | Performed by: HEALTH CARE PROVIDER

## 2024-05-29 PROCEDURE — 83880 ASSAY OF NATRIURETIC PEPTIDE: CPT | Performed by: HEALTH CARE PROVIDER

## 2024-05-29 RX ORDER — METOPROLOL SUCCINATE 50 MG/1
50 TABLET, EXTENDED RELEASE ORAL DAILY
Status: DISCONTINUED | OUTPATIENT
Start: 2024-05-30 | End: 2024-05-29

## 2024-05-29 RX ORDER — LORATADINE 10 MG/1
10 TABLET ORAL DAILY PRN
Status: DISCONTINUED | OUTPATIENT
Start: 2024-05-29 | End: 2024-05-30 | Stop reason: HOSPADM

## 2024-05-29 RX ORDER — POTASSIUM CHLORIDE 1.5 G/1.58G
40 POWDER, FOR SOLUTION ORAL ONCE
Status: COMPLETED | OUTPATIENT
Start: 2024-05-29 | End: 2024-05-29

## 2024-05-29 RX ORDER — PREDNISONE 5 MG/ML
5 SOLUTION ORAL DAILY
COMMUNITY
End: 2024-05-30 | Stop reason: HOSPADM

## 2024-05-29 RX ORDER — PANTOPRAZOLE SODIUM 40 MG/1
40 TABLET, DELAYED RELEASE ORAL
Status: DISCONTINUED | OUTPATIENT
Start: 2024-05-30 | End: 2024-05-30 | Stop reason: HOSPADM

## 2024-05-29 RX ORDER — FAMOTIDINE 20 MG/1
10 TABLET, FILM COATED ORAL 2 TIMES DAILY PRN
Status: DISCONTINUED | OUTPATIENT
Start: 2024-05-29 | End: 2024-05-30 | Stop reason: HOSPADM

## 2024-05-29 RX ORDER — ONDANSETRON HYDROCHLORIDE 2 MG/ML
4 INJECTION, SOLUTION INTRAVENOUS ONCE
Status: COMPLETED | OUTPATIENT
Start: 2024-05-29 | End: 2024-05-29

## 2024-05-29 RX ORDER — ACETAMINOPHEN 500 MG
5 TABLET ORAL NIGHTLY PRN
Status: DISCONTINUED | OUTPATIENT
Start: 2024-05-29 | End: 2024-05-30 | Stop reason: HOSPADM

## 2024-05-29 RX ORDER — ONDANSETRON HYDROCHLORIDE 2 MG/ML
4 INJECTION, SOLUTION INTRAVENOUS EVERY 6 HOURS PRN
Status: DISCONTINUED | OUTPATIENT
Start: 2024-05-29 | End: 2024-05-30 | Stop reason: HOSPADM

## 2024-05-29 RX ORDER — AMOXICILLIN 250 MG
2 CAPSULE ORAL NIGHTLY PRN
Status: DISCONTINUED | OUTPATIENT
Start: 2024-05-29 | End: 2024-05-30 | Stop reason: HOSPADM

## 2024-05-29 RX ORDER — METOPROLOL SUCCINATE 50 MG/1
50 TABLET, EXTENDED RELEASE ORAL DAILY
Status: DISCONTINUED | OUTPATIENT
Start: 2024-05-29 | End: 2024-05-30 | Stop reason: HOSPADM

## 2024-05-29 RX ORDER — ENOXAPARIN SODIUM 100 MG/ML
40 INJECTION SUBCUTANEOUS DAILY
Status: DISCONTINUED | OUTPATIENT
Start: 2024-05-30 | End: 2024-05-30 | Stop reason: HOSPADM

## 2024-05-29 RX ORDER — LABETALOL HYDROCHLORIDE 5 MG/ML
20 INJECTION, SOLUTION INTRAVENOUS EVERY 4 HOURS PRN
Status: DISCONTINUED | OUTPATIENT
Start: 2024-05-29 | End: 2024-05-30 | Stop reason: HOSPADM

## 2024-05-29 RX ORDER — CETIRIZINE HYDROCHLORIDE 10 MG/1
10 TABLET ORAL DAILY PRN
Status: DISCONTINUED | OUTPATIENT
Start: 2024-05-29 | End: 2024-05-29

## 2024-05-29 RX ADMIN — METOPROLOL SUCCINATE 50 MG: 50 TABLET, EXTENDED RELEASE ORAL at 17:48

## 2024-05-29 RX ADMIN — ONDANSETRON 4 MG: 2 INJECTION INTRAMUSCULAR; INTRAVENOUS at 09:33

## 2024-05-29 RX ADMIN — SODIUM CHLORIDE, POTASSIUM CHLORIDE, SODIUM LACTATE AND CALCIUM CHLORIDE 1000 ML: 600; 310; 30; 20 INJECTION, SOLUTION INTRAVENOUS at 09:33

## 2024-05-29 RX ADMIN — POTASSIUM CHLORIDE 40 MEQ: 1.5 POWDER, FOR SOLUTION ORAL at 12:05

## 2024-05-29 SDOH — SOCIAL STABILITY: SOCIAL INSECURITY: DOES ANYONE TRY TO KEEP YOU FROM HAVING/CONTACTING OTHER FRIENDS OR DOING THINGS OUTSIDE YOUR HOME?: NO

## 2024-05-29 SDOH — SOCIAL STABILITY: SOCIAL INSECURITY: HAS ANYONE EVER THREATENED TO HURT YOUR FAMILY OR YOUR PETS?: NO

## 2024-05-29 SDOH — SOCIAL STABILITY: SOCIAL INSECURITY: DO YOU FEEL UNSAFE GOING BACK TO THE PLACE WHERE YOU ARE LIVING?: NO

## 2024-05-29 SDOH — SOCIAL STABILITY: SOCIAL INSECURITY: ARE THERE ANY APPARENT SIGNS OF INJURIES/BEHAVIORS THAT COULD BE RELATED TO ABUSE/NEGLECT?: NO

## 2024-05-29 SDOH — SOCIAL STABILITY: SOCIAL INSECURITY: WERE YOU ABLE TO COMPLETE ALL THE BEHAVIORAL HEALTH SCREENINGS?: YES

## 2024-05-29 SDOH — SOCIAL STABILITY: SOCIAL INSECURITY: HAVE YOU HAD ANY THOUGHTS OF HARMING ANYONE ELSE?: NO

## 2024-05-29 SDOH — SOCIAL STABILITY: SOCIAL INSECURITY: ARE YOU OR HAVE YOU BEEN THREATENED OR ABUSED PHYSICALLY, EMOTIONALLY, OR SEXUALLY BY ANYONE?: NO

## 2024-05-29 SDOH — SOCIAL STABILITY: SOCIAL INSECURITY: HAVE YOU HAD THOUGHTS OF HARMING ANYONE ELSE?: NO

## 2024-05-29 SDOH — SOCIAL STABILITY: SOCIAL INSECURITY: ABUSE: ADULT

## 2024-05-29 SDOH — SOCIAL STABILITY: SOCIAL INSECURITY: DO YOU FEEL ANYONE HAS EXPLOITED OR TAKEN ADVANTAGE OF YOU FINANCIALLY OR OF YOUR PERSONAL PROPERTY?: NO

## 2024-05-29 ASSESSMENT — ACTIVITIES OF DAILY LIVING (ADL)
ADEQUATE_TO_COMPLETE_ADL: YES
DRESSING YOURSELF: INDEPENDENT
HEARING - RIGHT EAR: HEARING AID
FEEDING YOURSELF: INDEPENDENT
LACK_OF_TRANSPORTATION: NO
TOILETING: INDEPENDENT
BATHING: INDEPENDENT
HEARING - LEFT EAR: HEARING AID
WALKS IN HOME: INDEPENDENT
PATIENT'S MEMORY ADEQUATE TO SAFELY COMPLETE DAILY ACTIVITIES?: YES
ASSISTIVE_DEVICE: DENTURES UPPER;DENTURES LOWER
GROOMING: INDEPENDENT
JUDGMENT_ADEQUATE_SAFELY_COMPLETE_DAILY_ACTIVITIES: YES

## 2024-05-29 ASSESSMENT — PAIN SCALES - GENERAL
PAINLEVEL_OUTOF10: 0 - NO PAIN
PAINLEVEL_OUTOF10: 0 - NO PAIN

## 2024-05-29 ASSESSMENT — COLUMBIA-SUICIDE SEVERITY RATING SCALE - C-SSRS
1. IN THE PAST MONTH, HAVE YOU WISHED YOU WERE DEAD OR WISHED YOU COULD GO TO SLEEP AND NOT WAKE UP?: NO
6. HAVE YOU EVER DONE ANYTHING, STARTED TO DO ANYTHING, OR PREPARED TO DO ANYTHING TO END YOUR LIFE?: NO
2. HAVE YOU ACTUALLY HAD ANY THOUGHTS OF KILLING YOURSELF?: NO

## 2024-05-29 ASSESSMENT — COGNITIVE AND FUNCTIONAL STATUS - GENERAL
MOBILITY SCORE: 24
DAILY ACTIVITIY SCORE: 24
PATIENT BASELINE BEDBOUND: NO
MOBILITY SCORE: 24
MOBILITY SCORE: 24

## 2024-05-29 ASSESSMENT — LIFESTYLE VARIABLES
HOW MANY STANDARD DRINKS CONTAINING ALCOHOL DO YOU HAVE ON A TYPICAL DAY: PATIENT DOES NOT DRINK
AUDIT-C TOTAL SCORE: 0
HOW OFTEN DO YOU HAVE A DRINK CONTAINING ALCOHOL: NEVER
AUDIT-C TOTAL SCORE: 0
HOW OFTEN DO YOU HAVE 6 OR MORE DRINKS ON ONE OCCASION: NEVER
SKIP TO QUESTIONS 9-10: 1

## 2024-05-29 ASSESSMENT — PATIENT HEALTH QUESTIONNAIRE - PHQ9
SUM OF ALL RESPONSES TO PHQ9 QUESTIONS 1 & 2: 0
2. FEELING DOWN, DEPRESSED OR HOPELESS: NOT AT ALL
1. LITTLE INTEREST OR PLEASURE IN DOING THINGS: NOT AT ALL

## 2024-05-29 ASSESSMENT — PAIN - FUNCTIONAL ASSESSMENT
PAIN_FUNCTIONAL_ASSESSMENT: 0-10
PAIN_FUNCTIONAL_ASSESSMENT: 0-10

## 2024-05-29 NOTE — H&P
[facility]  Department of Hospital Medicine    HISTORY AND PHYSICAL    Chief Complaint   Patient presents with    Dizziness     Dizziness after using the restroom this morning. Pt states dizziness is intermittent        History Of Present Illness  Le Granados is a 78 y.o. female with hypertension, history of breast cancer in remission status post lumpectomy and radiation.  She presented to the ED with an episode of acute onset vertigo and lightheadedness associated with diaphoresis and dry heaves.  She went to the bathroom to urinate and defecate (denies straining) immediately after which she had acute onset dizziness and clamminess.  Felt like the room was spinning and like she was about to pass out.  She had her watch on at the time and noted that her heart rate was in the 70s to 80s.  She felt too weak to get off the toilet.  She noted 3 waves of diaphoresis.  The vertigo persisted well over 30 minutes and was still present when EMS arrived.  She was hypertensive with normal heart rate with EMS and in the ED.  Initial workup in the ED showed negative CT head, labs benign other than hypokalemia, cardiac markers negative.    Has only had 1 prior episode of vertigo a long time ago that lasted about a minute.  Takes metoprolol for hypertension and Zyrtec for allergies.  Is very active and plays golf several times a week, without any issues of exertional intolerance, chest pain, shortness of breath.  Denies current vision changes but had a retinal detachment last year.  Denies headache.  At the time of admission the vertigo and nausea have resolved, but she still feels somewhat weak.     Past Medical History  She has a past medical history of Breast cancer (Multi) (2021), COVID-19 (12/17/2020), Encounter for other preprocedural examination (03/05/2021), Other conditions influencing health status (12/02/2020), Personal history of malignant neoplasm of breast, Personal history  of other diseases of the circulatory system, Personal history of other diseases of the musculoskeletal system and connective tissue (09/21/2017), Personal history of other diseases of the nervous system and sense organs (03/05/2021), Personal history of other diseases of the respiratory system, Personal history of other specified conditions (02/23/2018), and Personal history of other specified conditions.    Surgical History  She has a past surgical history that includes Cervical fusion (09/25/2017); Lumbar laminectomy (09/25/2017); Hysterectomy (09/25/2017); Other surgical history (09/25/2017); Cataract extraction (09/25/2017); Knee arthroscopy w/ debridement (09/25/2017); Breast lumpectomy (09/25/2017); Tympanostomy tube placement (09/25/2017); Carpal tunnel release (09/25/2017); Hand tendon surgery (09/25/2017); Other surgical history (05/13/2021); and Breast biopsy (Right, 2008).     Social History  She reports that she has never smoked. She has never used smokeless tobacco. She reports that she does not drink alcohol and does not use drugs.    Family History  Family History   Adopted: Yes   Problem Relation Name Age of Onset    No Known Problems Mother          adopted    No Known Problems Father          adopted        Allergies  Azithromycin, Clindamycin, Fish containing products, Nut - unspecified, Penicillins, Tetracyclines, Tree nuts, Simvastatin, Sulfamethoxazole-trimethoprim, and Clarithromycin    Review of systems  11-point ROS was performed and is negative except as noted in the HPI.     Physical Exam   CON: awake, alert, minimal distress;   EYES: conjunctiva wnl; PERRL; EOMI  ENMT: hearing intact; MMM;   NECK: symmetric; thyroid and cervical nodes wnl;   CV: S1 S2 - RRR with no m/g/r; no lower extremity edema; normal JVP; symmetric pulses  RESP: normal work of breathing; lungs CTAB;   GI: abdomen nontender; no organomegaly;   SKIN: no lesions or rashes; no induration;   MSK: ROM wnl; digits wnl;  "  NEURO: language and speech wnl; sensation and motor function grossly intact   PSYCH: oriented to situation; affect normal;     Last Recorded Vitals  Blood pressure 155/70, pulse 68, temperature 36.3 °C (97.3 °F), resp. rate 18, height 1.6 m (5' 3\"), weight 86.3 kg (190 lb 4.1 oz), SpO2 96%.    Relevant Results  Lab Results   Component Value Date    WBC 6.3 05/29/2024    HGB 14.3 05/29/2024    HCT 43.1 05/29/2024    MCV 86 05/29/2024     05/29/2024      Lab Results   Component Value Date    GLUCOSE 146 (H) 05/29/2024    CALCIUM 9.4 05/29/2024     05/29/2024    K 3.4 (L) 05/29/2024    CO2 26 05/29/2024     05/29/2024    BUN 18 05/29/2024    CREATININE 0.70 05/29/2024        Scheduled medications:  [START ON 5/30/2024] enoxaparin, 40 mg, subcutaneous, Daily      Continuous medications:     PRN medications:  PRN medications: famotidine, melatonin, ondansetron, oxygen, sennosides-docusate sodium        Assessment/Plan   Principal Problem:    Stepheniey    Le Granados is a 78 y.o. female with hypertension, history of breast cancer in remission status post lumpectomy and radiation.  She presented to the ED with an episode of acute onset vertigo and lightheadedness associated with diaphoresis and dry heaves.  She went to the bathroom to urinate and defecate (denies straining) immediately after which she had acute onset dizziness and clamminess.  Felt like the room was spinning and like she was about to pass out.  She had her watch on at the time and noted that her heart rate was in the 70s to 80s.  She felt too weak to get off the toilet.  She noted 3 waves of diaphoresis.  The vertigo persisted well over 30 minutes and was still present when EMS arrived.  She was hypertensive with normal heart rate with EMS and in the ED.  Initial workup in the ED showed negative CT head, labs benign other than hypokalemia, cardiac markers negative.    Acute vestibular syndrome: Now resolved  > With nonpositional vertigo " sustained for over 30 minutes, high likelihood of posterior circulation TIA.  Vasovagal syncope is on the differential given the timing related to voiding, but seems unlikely given the duration, the normal heart rate, and the elevated blood pressure while still symptomatic.  - TTE with bubble study   - MRI, MRA   - permissive HTN for 48 hours  - Lipid panel  - A1C  - trend sodium  - +/- ASA  - high intensity statin  - tele for 24 hours  - neuro checks  - neurology consulted  - cardiology consulted   - PT/OT not required; no deficits   - follow-up with PCP and neurology on discharge    Hypertension:  -Resume metoprolol as indicated  -Labetalol as needed for severe hypertension    DVT PPx:  -Enoxaparin    Dispo: Observation, likely 1 to 2 days       Kade Alarcon MD    Patient Name:  Le Granados   MRN:   [id number]   [facility]

## 2024-05-29 NOTE — PROGRESS NOTES
05/29/24 1453   Discharge Planning   Living Arrangements Spouse/significant other;Children   Support Systems Spouse/significant other;Children   Assistance Needed patient is alert and oriented x3, independent in ADL's, uses no AD, no DME, drives   Type of Residence Private residence   Number of Stairs Within Residence 18  (lives on main level)   Who is requesting discharge planning? Provider   Home or Post Acute Services None   Patient expects to be discharged to: Home No needs   Does the patient need discharge transport arranged? No

## 2024-05-29 NOTE — ED PROVIDER NOTES
HPI   Chief Complaint   Patient presents with    Dizziness     Dizziness after using the restroom this morning. Pt states dizziness is intermittent       CC: Nausea vomiting, dizziness  HPI:   78-year-old female presents ED complaining of acute onset of nausea vomiting, dizziness, she has a history of hypertension, breast cancer, status postlumpectomy, radiation therapy, no chemo, in remission, patient denies having any chest pain or shortness of breath, she reports symptoms started abruptly this morning while she was sitting on her toilet, patient states that she was not straining, she all of a sudden became very diaphoretic, dizzy, she called her  who called 911, patient reports having similar episode several years ago in the past however not this intense.  She denies any recent illnesses, fevers, chills, she denies having any chest pain, abdominal pain, she notes spinning sensation dizziness worse with movement.    Additional Limitations to History:   External Records Reviewed: I reviewed recent and relevant outside records including   History Obtained From:     Past Medical History: Per HPI  Medications: Reviewed in EMR and with patient  Allergies:  Reviewed in EMR  Past Surgical History:   Social History:     ------------------------------------------------------------------------------------------------------  Physical Exam:  --Vital signs reviewed in nursing triage note, EMR flow sheets, and at patient's bedside  GEN:  A&Ox3, no acute distress, appears comfortable.  Conversational and appropriate.  No confusion or gross mental status changes.  EYES: EOMI, non-injected sclera.  ENT: Moist mucous membranes, no apparent injuries or lesions.   CARDIO: Normal rate and regular rhythm. No murmurs, rubs, or gallops.  2+ equal pulses of the distal extremities.   PULM: Clear to auscultation bilaterally. No rales, rhonchi, or wheezes. Good symmetric chest expansion.  GI: Soft, non-tender, non-distended. No  rebound tenderness or guarding.  SKIN: Warm and dry, no rashes or lesions.  MSK: ROM intact the extremities without contractures.   EXT: No peripheral edema, contusions, or wounds.   NEURO: Cranial nerves II-XII grossly intact. Sensation to light touch intact and equal bilaterally in upper and lower extremities.  Symmetric 5/5 strength in upper and lower extremities.  PSYCH: Appropriate mood and behavior, converses and responds appropriately during exam.  -------------------------------------------------------------------------------------------------------        Differential Diagnoses Considered:   Chronic Medical Conditions Significantly Affecting Care:   Diagnostic testing considered: [PERC, D-Dimer, PECARN, etc.]      - I independently interpreted: [CXR, CT, POCUS, etc. including your interpretation]  - Labs notable for     Escalation of Care: Appropriate for   Social Determinants of Health Significantly Affecting Care: [Homelessness, lacking transportation, uninsured, unable to afford medications]  Prescription Drug Consideration: [Antibiotics, antivirals, pain medications, etc.]  Discussion of Management with Other Providers:  I discussed the patient/results with: [admitting team, consultant, radiologist, social work, EPAT, case management, PT/OT, RT, PCP, etc.]      George Oliver PA-C                          Vahid Coma Scale Score: 15                     Patient History   Past Medical History:   Diagnosis Date    Breast cancer (Multi) 2021    lt side    COVID-19 12/17/2020    COVID-19 virus infection    Encounter for other preprocedural examination 03/05/2021    Preop examination    Other conditions influencing health status 12/02/2020    History of cough    Personal history of malignant neoplasm of breast     History of malignant neoplasm of breast    Personal history of other diseases of the circulatory system     History of hypertension    Personal history of other diseases of the musculoskeletal  system and connective tissue 09/21/2017    History of bone disorder    Personal history of other diseases of the nervous system and sense organs 03/05/2021    History of otitis media    Personal history of other diseases of the respiratory system     History of pneumothorax    Personal history of other specified conditions 02/23/2018    History of abdominal pain    Personal history of other specified conditions     History of chest pain     Past Surgical History:   Procedure Laterality Date    BREAST BIOPSY Right 2008    bgn    BREAST LUMPECTOMY  09/25/2017    Breast Surgery Lumpectomy    CARPAL TUNNEL RELEASE  09/25/2017    Neuroplasty Decompression Median Nerve At Carpal Tunnel    CATARACT EXTRACTION  09/25/2017    Cataract Extraction    CERVICAL FUSION  09/25/2017    Cervical Vertebral Fusion    HAND TENDON SURGERY  09/25/2017    Hand Incision Tendon Sheath Of A Finger    HYSTERECTOMY  09/25/2017    Hysterectomy    KNEE ARTHROSCOPY W/ DEBRIDEMENT  09/25/2017    Arthroscopy Knee Right    LUMBAR LAMINECTOMY  09/25/2017    Laminectomy Lumbar    OTHER SURGICAL HISTORY  09/25/2017    Oophorectomy - Bilateral (Removal Of Both Ovaries)    OTHER SURGICAL HISTORY  05/13/2021    Knee replacement    TYMPANOSTOMY TUBE PLACEMENT  09/25/2017    Ear Pressure Equalization Tube     Family History   Adopted: Yes   Problem Relation Name Age of Onset    No Known Problems Mother          adopted    No Known Problems Father          adopted     Social History     Tobacco Use    Smoking status: Never    Smokeless tobacco: Never   Substance Use Topics    Alcohol use: Never    Drug use: Never       Physical Exam   ED Triage Vitals [05/29/24 0846]   Temperature Heart Rate Respirations BP   36.3 °C (97.3 °F) 67 16 157/68      Pulse Ox Temp src Heart Rate Source Patient Position   94 % -- Monitor --      BP Location FiO2 (%)     -- --       Physical Exam    ED Course & MDM   ED Course as of 05/30/24 1036   Wed May 29, 2024   1116  78-year-old presents emergency department near syncope.  Got diaphoretic on the toilet.  States that she felt like she was going to pass out.  Reports a room spinning sensation.  Normal finger-nose-finger on exam.  Lower suspicion for posterior circulation stroke however would benefit from an MRI of the brain.  CT head negative.  She has no cardiac provocative testing done in the past.  Serial troponins negative.  Patient amendable to near syncopal workup with observation in the hospital. [HD]      ED Course User Index  [HD] Regina Nava DO         Diagnoses as of 05/30/24 1036   Dizziness       Medical Decision Making  78-year-old female with acute onset of dizziness, nausea vomiting, she is neurologically intact hemodynamically stable, did report feeling some relief after receiving Zofran, no obvious neurodeficits on exam, no obvious ataxia, suspicion for benign positional vertigo versus CVA, discussed with patient, ED attending receptive for further workup and observation at this time for near syncope dizziness workup.        Procedure  Procedures     George Oliver PA-C  05/29/24 0921       George Oliver PA-C  05/30/24 1041

## 2024-05-30 ENCOUNTER — APPOINTMENT (OUTPATIENT)
Dept: CARDIOLOGY | Facility: HOSPITAL | Age: 79
End: 2024-05-30
Payer: MEDICARE

## 2024-05-30 VITALS
HEART RATE: 80 BPM | TEMPERATURE: 98.4 F | HEIGHT: 63 IN | RESPIRATION RATE: 18 BRPM | SYSTOLIC BLOOD PRESSURE: 153 MMHG | WEIGHT: 190.26 LBS | DIASTOLIC BLOOD PRESSURE: 81 MMHG | BODY MASS INDEX: 33.71 KG/M2 | OXYGEN SATURATION: 95 %

## 2024-05-30 LAB
ANION GAP SERPL CALC-SCNC: 11 MMOL/L (ref 10–20)
BUN SERPL-MCNC: 12 MG/DL (ref 6–23)
CALCIUM SERPL-MCNC: 9 MG/DL (ref 8.6–10.3)
CHLORIDE SERPL-SCNC: 103 MMOL/L (ref 98–107)
CHOLEST SERPL-MCNC: 191 MG/DL (ref 0–199)
CHOLESTEROL/HDL RATIO: 3
CO2 SERPL-SCNC: 28 MMOL/L (ref 21–32)
CREAT SERPL-MCNC: 0.67 MG/DL (ref 0.5–1.05)
EGFRCR SERPLBLD CKD-EPI 2021: 90 ML/MIN/1.73M*2
EST. AVERAGE GLUCOSE BLD GHB EST-MCNC: 105 MG/DL
GLUCOSE SERPL-MCNC: 97 MG/DL (ref 74–99)
HBA1C MFR BLD: 5.3 %
HDLC SERPL-MCNC: 62.7 MG/DL
HOLD SPECIMEN: NORMAL
LDLC SERPL CALC-MCNC: 111 MG/DL
MAGNESIUM SERPL-MCNC: 1.84 MG/DL (ref 1.6–2.4)
NON HDL CHOLESTEROL: 128 MG/DL (ref 0–149)
POTASSIUM SERPL-SCNC: 4 MMOL/L (ref 3.5–5.3)
SODIUM SERPL-SCNC: 138 MMOL/L (ref 136–145)
TRIGL SERPL-MCNC: 86 MG/DL (ref 0–149)
VLDL: 17 MG/DL (ref 0–40)

## 2024-05-30 PROCEDURE — 99223 1ST HOSP IP/OBS HIGH 75: CPT | Performed by: NURSE PRACTITIONER

## 2024-05-30 PROCEDURE — HRANC PR HEARING AID NO CHARGE: Performed by: PSYCHIATRY & NEUROLOGY

## 2024-05-30 PROCEDURE — 99238 HOSP IP/OBS DSCHRG MGMT 30/<: CPT | Performed by: STUDENT IN AN ORGANIZED HEALTH CARE EDUCATION/TRAINING PROGRAM

## 2024-05-30 PROCEDURE — 93010 ELECTROCARDIOGRAM REPORT: CPT | Performed by: INTERNAL MEDICINE

## 2024-05-30 PROCEDURE — 80061 LIPID PANEL: CPT | Performed by: STUDENT IN AN ORGANIZED HEALTH CARE EDUCATION/TRAINING PROGRAM

## 2024-05-30 PROCEDURE — 93005 ELECTROCARDIOGRAM TRACING: CPT

## 2024-05-30 PROCEDURE — 36415 COLL VENOUS BLD VENIPUNCTURE: CPT | Performed by: STUDENT IN AN ORGANIZED HEALTH CARE EDUCATION/TRAINING PROGRAM

## 2024-05-30 PROCEDURE — 2500000001 HC RX 250 WO HCPCS SELF ADMINISTERED DRUGS (ALT 637 FOR MEDICARE OP): Performed by: STUDENT IN AN ORGANIZED HEALTH CARE EDUCATION/TRAINING PROGRAM

## 2024-05-30 PROCEDURE — 83735 ASSAY OF MAGNESIUM: CPT | Performed by: STUDENT IN AN ORGANIZED HEALTH CARE EDUCATION/TRAINING PROGRAM

## 2024-05-30 PROCEDURE — 80048 BASIC METABOLIC PNL TOTAL CA: CPT | Performed by: STUDENT IN AN ORGANIZED HEALTH CARE EDUCATION/TRAINING PROGRAM

## 2024-05-30 PROCEDURE — G0378 HOSPITAL OBSERVATION PER HR: HCPCS

## 2024-05-30 RX ADMIN — METOPROLOL SUCCINATE 50 MG: 50 TABLET, EXTENDED RELEASE ORAL at 09:32

## 2024-05-30 RX ADMIN — PANTOPRAZOLE SODIUM 40 MG: 40 TABLET, DELAYED RELEASE ORAL at 05:55

## 2024-05-30 ASSESSMENT — COGNITIVE AND FUNCTIONAL STATUS - GENERAL
MOBILITY SCORE: 24
DAILY ACTIVITIY SCORE: 24

## 2024-05-30 ASSESSMENT — PAIN - FUNCTIONAL ASSESSMENT: PAIN_FUNCTIONAL_ASSESSMENT: 0-10

## 2024-05-30 ASSESSMENT — PAIN SCALES - GENERAL: PAINLEVEL_OUTOF10: 0 - NO PAIN

## 2024-05-30 NOTE — DISCHARGE SUMMARY
Discharge Diagnosis  Dizzy    Issues Requiring Follow-Up  Event monitor     Discharge Meds     Your medication list        CONTINUE taking these medications        Instructions Last Dose Given Next Dose Due   EPINEPHrine 0.3 mg/0.3 mL injection syringe  Commonly known as: Epipen      INJECT AS DIRECTED FOR ANAPHYLAXIS (AND CALL 911 IF OCCURS)       metoprolol succinate XL 50 mg 24 hr tablet  Commonly known as: Toprol-XL      TAKE 1 TABLET BY MOUTH EVERY DAY       multivitamin tablet           omeprazole 40 mg DR capsule  Commonly known as: PriLOSEC      TAKE 1 CAPSULE BY MOUTH EVERY DAY       ZYRTEC ORAL                  STOP taking these medications      predniSONE 5 mg/5 mL solution                 Test Results Pending At Discharge  Pending Labs       No current pending labs.            Hospital Course   Le Granados is a 78 y.o. female with hypertension, history of breast cancer in remission status post lumpectomy and radiation.  She presented to the ED with an episode of acute onset vertigo and lightheadedness associated with diaphoresis and dry heaves.  She went to the bathroom to urinate and defecate (denies straining) immediately after which she had acute onset dizziness and clamminess.  Felt like the room was spinning and like she was about to pass out.  She had her watch on at the time and noted that her heart rate was in the 70s to 80s.  She felt too weak to get off the toilet.  She noted 3 waves of diaphoresis.  The vertigo persisted well over 30 minutes and was still present when EMS arrived.  She was hypertensive with normal heart rate with EMS and in the ED.  Initial workup in the ED showed negative CT head, labs benign other than hypokalemia, cardiac markers negative. She was admitted to observation. Seen by neurology and cardiology. MRI brain and MRA head/neck were benign.  Echocardiogram showed normal EF, no PFO.  No A-fib noted on telemetry.        Outpatient Follow-Up  Future Appointments   Date  Time Provider Department [place]   2/28/2025 10:30 AM Bonny Malloy, APRN-CNP [id number]         Kade Alarcon MD

## 2024-05-30 NOTE — PROGRESS NOTES
05/30/24 1200   Discharge Planning   Patient expects to be discharged to: Discharge confirmed and patient and spouse aware and are ready to go. RN and RN Navigator on floor aware of dc. Patient denies any home going needs.

## 2024-05-30 NOTE — CONSULTS
Inpatient consult to Neurology  Consult performed by: Jovani Soriano MD  Consult ordered by: Kade Alarcon MD               Review of Systems      Neurological Exam        Assessment/Plan     My assesment was non oneirological (etiology) event based on chart review.     I did preliminary note and data analysis before seeing her. When I went to see her but she was not in the room. I started searching in MRI and Echo room and could not find her later one I cam back to see but she was discharged.    This visit cannot be billed.

## 2024-05-30 NOTE — CONSULTS
Consults  History Of Present Illness:    Le Granados is a 78 y.o. female from home with h/o htn, breast cancer left breast s/p lumpectomy and radiation, environmental allergies presenting with vertigo symptoms.  Woke up yesterday morning and went to use the bathroom.  She did void and have a BM, but did not need to strain. She had sudden onset room spinning dizziness.  Became nauseated and felt unbalanced.  She called for her  to call 911.  Symptoms lasted for 30 minutes and resolved spontaneously.  She states she has been having severe environmental allergies with sinus congestion and taking Zyrtec for this.  Denies symptoms of chest pain, SOB, palpitations, or syncope.  Feels back to baseline since prior to arrival to the ER.  MRI/MRA negative for acute pathology.  BNP 44. Lactate 1.5. ///76. ECG shows NSR, HR 63bpm, Qtc 435 msec.      Last Recorded Vitals:  Vitals:    05/30/24 0006 05/30/24 0442 05/30/24 0852 05/30/24 1000   BP: 126/76 147/84  153/81   BP Location: Right arm Right arm  Right arm   Patient Position: Lying Lying  Lying   Pulse: 72 71 93 80   Resp: 18 17  18   Temp: 36 °C (96.8 °F) 36.1 °C (97 °F)  36.9 °C (98.4 °F)   TempSrc: Temporal Temporal  Temporal   SpO2: 94%   95%   Weight:       Height:           Last Labs:  CBC - 5/29/2024:  9:20 AM  6.3 14.3 185    43.1      CMP - 5/30/2024:  5:38 AM  9.0 7.1 16 --- 0.6   _ 4.3 10 74      PTT - No results in last year.  1.1   11.9 _     Troponin I, High Sensitivity   Date/Time Value Ref Range Status   05/29/2024 10:25 AM <3 0 - 13 ng/L Final   05/29/2024 09:20 AM 3 0 - 13 ng/L Final     Troponin I   Date/Time Value Ref Range Status   01/06/2023 07:45 PM <3 0 - 13 ng/L Final     Comment:     .  Less than 99th percentile of normal range cutoff-  Female and children under 18 years old <14 ng/L; Male <21 ng/L: Negative  Repeat testing should be performed if clinically indicated.   .  Female and children under 18 years old 14-50  ng/L; Male 21-50 ng/L:  Consistent with possible cardiac damage and possible increased clinical   risk. Serial measurements may help to assess extent of myocardial damage.   .  >50 ng/L: Consistent with cardiac damage, increased clinical risk and  myocardial infarction. Serial measurements may help assess extent of   myocardial damage.   .   NOTE: Children less than 1 year old may have higher baseline troponin   levels and results should be interpreted in conjunction with the overall   clinical context.   .  NOTE: Troponin I testing is performed using a different   testing methodology at [facility] than at other   [facility] hospitals. Direct result comparisons should only   be made within the same method.       BNP   Date/Time Value Ref Range Status   05/29/2024 09:20 AM 44 0 - 99 pg/mL Final   03/02/2022 03:36 PM 37 0 - 99 pg/mL Final     Comment:     .  <100 pg/mL - Heart failure unlikely  100-299 pg/mL - Intermediate probability of acute heart  .               failure exacerbation. Correlate with clinical  .               context and patient history.    >=300 pg/mL - Heart Failure likely. Correlate with clinical  .               context and patient history.   Biotin interference may cause falsely decreased results.   Patients taking a Biotin dose of up to 5 mg/day should   refrain from taking Biotin for 24 hours before sample   collection. Providers may contact their local laboratory   for further information.     09/20/2021 12:08 PM 47 0 - 99 pg/mL Final     Comment:     .  <100 pg/mL - Heart failure unlikely  100-299 pg/mL - Intermediate probability of acute heart  .               failure exacerbation. Correlate with clinical  .               context and patient history.    >=300 pg/mL - Heart Failure likely. Correlate with clinical  .               context and patient history.  BNP testing is performed using different testing   methodology at [facility] than at other   system  [facility]. Direct result comparisons should   only be made within the same method.       Hemoglobin A1C   Date/Time Value Ref Range Status   05/29/2024 09:20 AM 5.3 see below % Final   01/06/2023 07:45 PM 5.3 % Final     Comment:          Diagnosis of Diabetes-Adults   Non-Diabetic: < or = 5.6%   Increased risk for developing diabetes: 5.7-6.4%   Diagnostic of diabetes: > or = 6.5%  .       Monitoring of Diabetes                Age (y)     Therapeutic Goal (%)   Adults:          >18           <7.0   Pediatrics:    13-18           <7.5                   7-12           <8.0                   0- 6            7.5-8.5   American Diabetes Association. Diabetes Care 33(S1), Jan 2010.     03/05/2021 08:46 AM 5.4 % Final     Comment:          Diagnosis of Diabetes-Adults   Non-Diabetic: < or = 5.6%   Increased risk for developing diabetes: 5.7-6.4%   Diagnostic of diabetes: > or = 6.5%  .       Monitoring of Diabetes                Age (y)     Therapeutic Goal (%)   Adults:          >18           <7.0   Pediatrics:    13-18           <7.5                   7-12           <8.0                   0- 6            7.5-8.5   American Diabetes Association. Diabetes Care 33(S1), Jan 2010.       LDL Calculated   Date/Time Value Ref Range Status   05/30/2024 05:38  (H) <=99 mg/dL Final     Comment:                                 Near   Borderline      AGE      Desirable  Optimal    High     High     Very High     0-19 Y     0 - 109     ---    110-129   >/= 130     ----    20-24 Y     0 - 119     ---    120-159   >/= 160     ----      >24 Y     0 -  99   100-129  130-159   160-189     >/=190     10/28/2023 10:44  (H) <=99 mg/dL Final     Comment:                                 Near   Borderline      AGE      Desirable  Optimal    High     High     Very High     0-19 Y     0 - 109     ---    110-129   >/= 130     ----    20-24 Y     0 - 119     ---    120-159   >/= 160     ----      >24 Y     0 -  99   100-129  130-159    160-189     >/=190       VLDL   Date/Time Value Ref Range Status   05/30/2024 05:38 AM 17 0 - 40 mg/dL Final   10/28/2023 10:44 AM 19 0 - 40 mg/dL Final   01/06/2023 07:45 PM 18 0 - 40 mg/dL Final   03/05/2021 08:46 AM 21 0 - 40 mg/dL Final   06/25/2018 08:52 AM 23 0 - 40 mg/dL Final      Last I/O:  No intake/output data recorded.    Past Cardiology Tests (Last 3 Years):  EKG:  ECG 12 Lead 05/30/2024 (Preliminary)  NSR, HR 63bpm     Echo:  Transthoracic Echo (TTE) Complete 05/29/2024  CONCLUSIONS:   1. Left ventricular systolic function is normal with a 55-60% estimated ejection fraction.   2. Spectral Doppler shows an impaired relaxation pattern of left ventricular diastolic filling.   3. There is no evidence of a patent foramen ovale.   4. There is mild mitral and tricuspid regurgitation.     Stress Test:  2/1/2017  Summary:   1. [facility] Treadmill Score 8.   2. No clinical or electrocardiographic evidence for ischemia at a maximal workload.   3. Nuclear image results are reported separately.   4. The adequate level of stress was achieved.  IMPRESSION:  1. Normal stress myocardial perfusion imaging.  2. Well-maintained left ventricular function.  3.  No significant interval change from the previous study on  04/05/2011.    Past Medical History:  She has a past medical history of Breast cancer (Multi) (2021), COVID-19 (12/17/2020), Encounter for other preprocedural examination (03/05/2021), Other conditions influencing health status (12/02/2020), Personal history of malignant neoplasm of breast, Personal history of other diseases of the circulatory system, Personal history of other diseases of the musculoskeletal system and connective tissue (09/21/2017), Personal history of other diseases of the nervous system and sense organs (03/05/2021), Personal history of other diseases of the respiratory system, Personal history of other specified conditions (02/23/2018), and Personal history of other specified  conditions.    Past Surgical History:  She has a past surgical history that includes Cervical fusion (09/25/2017); Lumbar laminectomy (09/25/2017); Hysterectomy (09/25/2017); Other surgical history (09/25/2017); Cataract extraction (09/25/2017); Knee arthroscopy w/ debridement (09/25/2017); Breast lumpectomy (09/25/2017); Tympanostomy tube placement (09/25/2017); Carpal tunnel release (09/25/2017); Hand tendon surgery (09/25/2017); Other surgical history (05/13/2021); and Breast biopsy (Right, 2008).      Social History:  She reports that she has never smoked. She has never used smokeless tobacco. She reports that she does not drink alcohol and does not use drugs.    Family History:  Family History   Adopted: Yes   Problem Relation Name Age of Onset    No Known Problems Mother          adopted    No Known Problems Father          adopted        Allergies:  Azithromycin, Clindamycin, Fish containing products, Nut - unspecified, Penicillins, Tetracyclines, Tree nuts, Simvastatin, Sulfamethoxazole-trimethoprim, and Clarithromycin    Inpatient Medications:  Scheduled medications   Medication Dose Route Frequency    enoxaparin  40 mg subcutaneous Daily    metoprolol succinate XL  50 mg oral Daily    pantoprazole  40 mg oral Daily before breakfast    perflutren lipid microspheres  0.5-10 mL of dilution intravenous Once in imaging    perflutren protein A microsphere  0.5 mL intravenous Once in imaging    sulfur hexafluoride microsphr  2 mL intravenous Once in imaging     PRN medications   Medication    famotidine    labetaloL    loratadine    melatonin    ondansetron    oxygen    sennosides-docusate sodium     Continuous Medications   Medication Dose Last Rate     Outpatient Medications:  Current Outpatient Medications   Medication Instructions    cetirizine HCl (ZYRTEC ORAL) ZyrTEC Allergy    EPINEPHrine 0.3 mg/0.3 mL injection syringe INJECT AS DIRECTED FOR ANAPHYLAXIS (AND CALL 911 IF OCCURS)    metoprolol succinate XL  (TOPROL-XL) 50 mg, oral, Daily    multivitamin tablet 1 tablet, oral, Nightly    omeprazole (PRILOSEC) 40 mg, oral, Daily    predniSONE 5 mg, oral, Daily       Physical Exam:  Constitutional: Pleasant, Awake/Alert/Oriented to person place and time. No distress  Head: Atraumatic, Normocephalic  Eyes: EOMI. JORGE  Neck:No JVD  Cardiovascular: Regular rate and rhythm, S1, S2. No extra heart sounds or murmurs  Respiratory: Clear to auscultation bilaterally. No wheezing, rales or rhonchi. Good chest wall expansion  Abdomen: Soft, Nontender. Bowel sounds appreciated  Musculoskeletal: ROM intact. Muscle strength grossly intact upper and lower extremities 5/5.   Neurological: CNII-XII intact. Sensation grossly intact  Extremities: Warm and dry. No acute rashes and lesions  Psychiatric: Appropriate mood and affect       Assessment/Plan   Very pleasant 78 y.o. female from home with h/o htn, breast cancer left breast s/p lumpectomy and radiation, environmental allergies presenting with vertigo. MRI negative for stroke.     Echocardiogram shows preserved EF, no significant VHD.  ECG shows NSR, HR 63bpm. Telemetry shows NSR with intermittent PVCs.     Plan:  -Consider using antivert PRN for recurrent vertigo  -No further cardiac testing necessary at this time.    -Dispo planning per the primary team.     Peripheral IV 05/29/24 20 G Left Antecubital (Active)   Site Assessment Clean;Dry;Intact 05/30/24 0736   Dressing Type Transparent 05/30/24 0736   Line Status Saline locked 05/30/24 0736   Dressing Status Clean;Dry 05/30/24 0736   Number of days: 1       Code Status:  No Order      SHANE Murphy-CNP

## 2024-05-31 ENCOUNTER — PATIENT OUTREACH (OUTPATIENT)
Dept: CARE COORDINATION | Facility: CLINIC | Age: 79
End: 2024-05-31
Payer: MEDICARE

## 2024-05-31 NOTE — PROGRESS NOTES
Two attempts for SADIE left one message with spouse and the other left message on voicemail message with my name and contact number to call back. Needs follow up with PCP, cardiology.       Discharge Diagnosis  Dizzy     Issues Requiring Follow-Up  Event monitor        Ellen Fernandes , RN   Nurse Care Manager   [facility]   [phone]

## 2024-06-01 LAB
ATRIAL RATE: 63 BPM
P AXIS: -10 DEGREES
P OFFSET: 175 MS
P ONSET: 115 MS
PR INTERVAL: 222 MS
Q ONSET: 226 MS
QRS COUNT: 10 BEATS
QRS DURATION: 74 MS
QT INTERVAL: 426 MS
QTC CALCULATION(BAZETT): 435 MS
QTC FREDERICIA: 433 MS
R AXIS: 7 DEGREES
T AXIS: -13 DEGREES
T OFFSET: 439 MS
VENTRICULAR RATE: 63 BPM

## 2024-06-03 DIAGNOSIS — R42 VERTIGO: Primary | ICD-10-CM

## 2024-06-03 RX ORDER — MECLIZINE HYDROCHLORIDE 25 MG/1
25 TABLET ORAL 3 TIMES DAILY PRN
Qty: 30 TABLET | Refills: 0 | Status: SHIPPED | OUTPATIENT
Start: 2024-06-03 | End: 2025-06-03

## 2024-06-06 ENCOUNTER — PATIENT OUTREACH (OUTPATIENT)
Dept: CARE COORDINATION | Facility: CLINIC | Age: 79
End: 2024-06-06
Payer: MEDICARE

## 2024-06-06 NOTE — PROGRESS NOTES
Outreach call to patient following up on appointment with primary care provider. Reviewed chart patient has not followed up with PCP . Unable to locate follow up in system.   Called patient left friendly voicemail message reminder to schedule follow up with Dr. Rogel or she can call me to schedule with her-Thank you    Ellen Fernandes , RN   Nurse Care Manager   [facility]   [phone]

## 2024-06-07 ENCOUNTER — APPOINTMENT (OUTPATIENT)
Dept: CARDIOLOGY | Facility: HOSPITAL | Age: 79
End: 2024-06-07
Payer: MEDICARE

## 2024-06-07 ENCOUNTER — HOSPITAL ENCOUNTER (EMERGENCY)
Facility: HOSPITAL | Age: 79
Discharge: HOME | End: 2024-06-08
Attending: STUDENT IN AN ORGANIZED HEALTH CARE EDUCATION/TRAINING PROGRAM
Payer: MEDICARE

## 2024-06-07 ENCOUNTER — APPOINTMENT (OUTPATIENT)
Dept: RADIOLOGY | Facility: HOSPITAL | Age: 79
End: 2024-06-07
Payer: MEDICARE

## 2024-06-07 DIAGNOSIS — I10 HYPERTENSION, UNSPECIFIED TYPE: Primary | ICD-10-CM

## 2024-06-07 LAB
ALBUMIN SERPL BCP-MCNC: 4.6 G/DL (ref 3.4–5)
ALP SERPL-CCNC: 75 U/L (ref 33–136)
ALT SERPL W P-5'-P-CCNC: 9 U/L (ref 7–45)
ANION GAP SERPL CALC-SCNC: 12 MMOL/L (ref 10–20)
APPEARANCE UR: CLEAR
AST SERPL W P-5'-P-CCNC: 15 U/L (ref 9–39)
BASOPHILS # BLD AUTO: 0.07 X10*3/UL (ref 0–0.1)
BASOPHILS NFR BLD AUTO: 1.1 %
BILIRUB SERPL-MCNC: 0.5 MG/DL (ref 0–1.2)
BILIRUB UR STRIP.AUTO-MCNC: NEGATIVE MG/DL
BNP SERPL-MCNC: 63 PG/ML (ref 0–99)
BUN SERPL-MCNC: 16 MG/DL (ref 6–23)
CALCIUM SERPL-MCNC: 9.6 MG/DL (ref 8.6–10.3)
CARDIAC TROPONIN I PNL SERPL HS: 4 NG/L (ref 0–13)
CHLORIDE SERPL-SCNC: 101 MMOL/L (ref 98–107)
CO2 SERPL-SCNC: 26 MMOL/L (ref 21–32)
COLOR UR: COLORLESS
CREAT SERPL-MCNC: 0.86 MG/DL (ref 0.5–1.05)
EGFRCR SERPLBLD CKD-EPI 2021: 69 ML/MIN/1.73M*2
EOSINOPHIL # BLD AUTO: 0.22 X10*3/UL (ref 0–0.4)
EOSINOPHIL NFR BLD AUTO: 3.4 %
ERYTHROCYTE [DISTWIDTH] IN BLOOD BY AUTOMATED COUNT: 12.7 % (ref 11.5–14.5)
GLUCOSE SERPL-MCNC: 93 MG/DL (ref 74–99)
GLUCOSE UR STRIP.AUTO-MCNC: NORMAL MG/DL
HCT VFR BLD AUTO: 43.1 % (ref 36–46)
HGB BLD-MCNC: 14.2 G/DL (ref 12–16)
IMM GRANULOCYTES # BLD AUTO: 0.01 X10*3/UL (ref 0–0.5)
IMM GRANULOCYTES NFR BLD AUTO: 0.2 % (ref 0–0.9)
INR PPP: 1 (ref 0.9–1.1)
KETONES UR STRIP.AUTO-MCNC: NEGATIVE MG/DL
LACTATE SERPL-SCNC: 0.9 MMOL/L (ref 0.4–2)
LEUKOCYTE ESTERASE UR QL STRIP.AUTO: NEGATIVE
LIPASE SERPL-CCNC: 24 U/L (ref 9–82)
LYMPHOCYTES # BLD AUTO: 2.1 X10*3/UL (ref 0.8–3)
LYMPHOCYTES NFR BLD AUTO: 32 %
MAGNESIUM SERPL-MCNC: 1.96 MG/DL (ref 1.6–2.4)
MCH RBC QN AUTO: 28.7 PG (ref 26–34)
MCHC RBC AUTO-ENTMCNC: 32.9 G/DL (ref 32–36)
MCV RBC AUTO: 87 FL (ref 80–100)
MONOCYTES # BLD AUTO: 0.63 X10*3/UL (ref 0.05–0.8)
MONOCYTES NFR BLD AUTO: 9.6 %
NEUTROPHILS # BLD AUTO: 3.53 X10*3/UL (ref 1.6–5.5)
NEUTROPHILS NFR BLD AUTO: 53.7 %
NITRITE UR QL STRIP.AUTO: NEGATIVE
NRBC BLD-RTO: 0 /100 WBCS (ref 0–0)
PH UR STRIP.AUTO: 6.5 [PH]
PLATELET # BLD AUTO: 177 X10*3/UL (ref 150–450)
POTASSIUM SERPL-SCNC: 3.9 MMOL/L (ref 3.5–5.3)
PROT SERPL-MCNC: 7.1 G/DL (ref 6.4–8.2)
PROT UR STRIP.AUTO-MCNC: NEGATIVE MG/DL
PROTHROMBIN TIME: 11.4 SECONDS (ref 9.8–12.8)
RBC # BLD AUTO: 4.95 X10*6/UL (ref 4–5.2)
RBC # UR STRIP.AUTO: NEGATIVE /UL
SODIUM SERPL-SCNC: 135 MMOL/L (ref 136–145)
SP GR UR STRIP.AUTO: 1.01
UROBILINOGEN UR STRIP.AUTO-MCNC: NORMAL MG/DL
WBC # BLD AUTO: 6.6 X10*3/UL (ref 4.4–11.3)

## 2024-06-07 PROCEDURE — 36415 COLL VENOUS BLD VENIPUNCTURE: CPT | Performed by: HEALTH CARE PROVIDER

## 2024-06-07 PROCEDURE — 93005 ELECTROCARDIOGRAM TRACING: CPT

## 2024-06-07 PROCEDURE — 83605 ASSAY OF LACTIC ACID: CPT | Performed by: HEALTH CARE PROVIDER

## 2024-06-07 PROCEDURE — 99285 EMERGENCY DEPT VISIT HI MDM: CPT | Mod: 25

## 2024-06-07 PROCEDURE — 85610 PROTHROMBIN TIME: CPT | Performed by: HEALTH CARE PROVIDER

## 2024-06-07 PROCEDURE — 83735 ASSAY OF MAGNESIUM: CPT | Performed by: HEALTH CARE PROVIDER

## 2024-06-07 PROCEDURE — 80053 COMPREHEN METABOLIC PANEL: CPT | Performed by: HEALTH CARE PROVIDER

## 2024-06-07 PROCEDURE — 74174 CTA ABD&PLVS W/CONTRAST: CPT

## 2024-06-07 PROCEDURE — 71275 CT ANGIOGRAPHY CHEST: CPT | Performed by: RADIOLOGY

## 2024-06-07 PROCEDURE — 83880 ASSAY OF NATRIURETIC PEPTIDE: CPT | Performed by: HEALTH CARE PROVIDER

## 2024-06-07 PROCEDURE — 84484 ASSAY OF TROPONIN QUANT: CPT | Mod: 91 | Performed by: HEALTH CARE PROVIDER

## 2024-06-07 PROCEDURE — 85025 COMPLETE CBC W/AUTO DIFF WBC: CPT | Performed by: HEALTH CARE PROVIDER

## 2024-06-07 PROCEDURE — 84484 ASSAY OF TROPONIN QUANT: CPT | Performed by: HEALTH CARE PROVIDER

## 2024-06-07 PROCEDURE — 83690 ASSAY OF LIPASE: CPT | Performed by: HEALTH CARE PROVIDER

## 2024-06-07 PROCEDURE — 2550000001 HC RX 255 CONTRASTS: Performed by: HEALTH CARE PROVIDER

## 2024-06-07 PROCEDURE — 81003 URINALYSIS AUTO W/O SCOPE: CPT | Performed by: HEALTH CARE PROVIDER

## 2024-06-07 PROCEDURE — 2500000001 HC RX 250 WO HCPCS SELF ADMINISTERED DRUGS (ALT 637 FOR MEDICARE OP): Performed by: STUDENT IN AN ORGANIZED HEALTH CARE EDUCATION/TRAINING PROGRAM

## 2024-06-07 PROCEDURE — 74174 CTA ABD&PLVS W/CONTRAST: CPT | Performed by: RADIOLOGY

## 2024-06-07 RX ORDER — AMLODIPINE BESYLATE 5 MG/1
5 TABLET ORAL ONCE
Status: COMPLETED | OUTPATIENT
Start: 2024-06-07 | End: 2024-06-07

## 2024-06-07 RX ORDER — AMLODIPINE BESYLATE 5 MG/1
5 TABLET ORAL DAILY
Qty: 30 TABLET | Refills: 0 | Status: SHIPPED | OUTPATIENT
Start: 2024-06-07 | End: 2024-07-07

## 2024-06-07 RX ADMIN — AMLODIPINE BESYLATE 5 MG: 5 TABLET ORAL at 23:45

## 2024-06-07 RX ADMIN — IOHEXOL 90 ML: 350 INJECTION, SOLUTION INTRAVENOUS at 22:59

## 2024-06-07 ASSESSMENT — PAIN - FUNCTIONAL ASSESSMENT: PAIN_FUNCTIONAL_ASSESSMENT: 0-10

## 2024-06-07 ASSESSMENT — LIFESTYLE VARIABLES
EVER FELT BAD OR GUILTY ABOUT YOUR DRINKING: NO
EVER HAD A DRINK FIRST THING IN THE MORNING TO STEADY YOUR NERVES TO GET RID OF A HANGOVER: NO
HAVE YOU EVER FELT YOU SHOULD CUT DOWN ON YOUR DRINKING: NO
HAVE PEOPLE ANNOYED YOU BY CRITICIZING YOUR DRINKING: NO
TOTAL SCORE: 0

## 2024-06-07 ASSESSMENT — PAIN SCALES - GENERAL
PAINLEVEL_OUTOF10: 6
PAINLEVEL_OUTOF10: 6
PAINLEVEL_OUTOF10: 0 - NO PAIN

## 2024-06-08 VITALS
OXYGEN SATURATION: 96 % | WEIGHT: 185 LBS | TEMPERATURE: 97.2 F | HEART RATE: 71 BPM | DIASTOLIC BLOOD PRESSURE: 78 MMHG | HEIGHT: 63 IN | BODY MASS INDEX: 32.78 KG/M2 | RESPIRATION RATE: 20 BRPM | SYSTOLIC BLOOD PRESSURE: 141 MMHG

## 2024-06-08 LAB
CARDIAC TROPONIN I PNL SERPL HS: 4 NG/L (ref 0–13)
HOLD SPECIMEN: NORMAL

## 2024-06-08 NOTE — ED PROVIDER NOTES
HPI   Chief Complaint   Patient presents with    Hypertension     Hx of hypertension, takes 50mg of metoprolol daily. Pt noticed today that her blood pressure was running higher than normal. Pt took a second dose of metoprolol and hour prior to arrival. Endorses chest pressure and shortness of breath on exertion.        CC: HTN, chest, abd pressure  HPI:   78-year-old female with history of breast cancer in remission status postlumpectomy radiation therapy recently admitted to the hospital on 5/29/24 for dizziness, patient had MRI/MRA with no concerning findings, patient also was seen by neurology and cardiology echo showed normal EF,  patient states having acute upper abdominal lower chest pressure, hypertension, she is on 50 mg of metoprolol, she took her normal morning dose and took another dose of 50 mg at 9:00 this evening.  She denies any associated nausea vomiting, she does report mild headache, she denies any upper/lower extremity weakness numbness paresthesia.  Denies having any fever, chills    Additional Limitations to History:   External Records Reviewed: I reviewed recent and relevant outside records including   History Obtained From:     Past Medical History: Per HPI  Medications: Reviewed in EMR and with patient  Allergies:  Reviewed in EMR  Past Surgical History:   Social History:     ------------------------------------------------------------------------------------------------------  Physical Exam:  --Vital signs reviewed in nursing triage note, EMR flow sheets, and at patient's bedside  GEN:  A&Ox3, no acute distress, appears comfortable.  Conversational and appropriate.  No confusion or gross mental status changes.  EYES: EOMI, non-injected sclera.  ENT: Moist mucous membranes, no apparent injuries or lesions.   CARDIO: Normal rate and regular rhythm. No murmurs, rubs, or gallops.  2+ equal pulses of the distal extremities.   PULM: Clear to auscultation bilaterally. No rales, rhonchi, or  wheezes. Good symmetric chest expansion.  GI: Soft, non-tender, non-distended. No rebound tenderness or guarding.  SKIN: Warm and dry, no rashes or lesions.  MSK: ROM intact the extremities without contractures.   EXT: No peripheral edema, contusions, or wounds.   NEURO: Cranial nerves II-XII grossly intact. Sensation to light touch intact and equal bilaterally in upper and lower extremities.  Symmetric 5/5 strength in upper and lower extremities.  PSYCH: Appropriate mood and behavior, converses and responds appropriately during exam.  -------------------------------------------------------------------------------------------------------        Differential Diagnoses Considered:   Chronic Medical Conditions Significantly Affecting Care:   Diagnostic testing considered: [PERC, D-Dimer, PECARN, etc.]    - EKG interpreted by myself   - I independently interpreted: [CXR, CT, POCUS, etc. including your interpretation]  - Labs notable for     Escalation of Care: Appropriate for   Social Determinants of Health Significantly Affecting Care: [Homelessness, lacking transportation, uninsured, unable to afford medications]  Prescription Drug Consideration: [Antibiotics, antivirals, pain medications, etc.]  Discussion of Management with Other Providers:  I discussed the patient/results with: [admitting team, consultant, radiologist, social work, EPAT, case management, PT/OT, RT, PCP, etc.]      George Oliver PA-C                          No data recorded                   Patient History   Past Medical History:   Diagnosis Date    Breast cancer (Multi) 2021    lt side    COVID-19 12/17/2020    COVID-19 virus infection    Encounter for other preprocedural examination 03/05/2021    Preop examination    Other conditions influencing health status 12/02/2020    History of cough    Personal history of malignant neoplasm of breast     History of malignant neoplasm of breast    Personal history of other diseases of the circulatory  system     History of hypertension    Personal history of other diseases of the musculoskeletal system and connective tissue 09/21/2017    History of bone disorder    Personal history of other diseases of the nervous system and sense organs 03/05/2021    History of otitis media    Personal history of other diseases of the respiratory system     History of pneumothorax    Personal history of other specified conditions 02/23/2018    History of abdominal pain    Personal history of other specified conditions     History of chest pain     Past Surgical History:   Procedure Laterality Date    BREAST BIOPSY Right 2008    bgn    BREAST LUMPECTOMY  09/25/2017    Breast Surgery Lumpectomy    CARPAL TUNNEL RELEASE  09/25/2017    Neuroplasty Decompression Median Nerve At Carpal Tunnel    CATARACT EXTRACTION  09/25/2017    Cataract Extraction    CERVICAL FUSION  09/25/2017    Cervical Vertebral Fusion    HAND TENDON SURGERY  09/25/2017    Hand Incision Tendon Sheath Of A Finger    HYSTERECTOMY  09/25/2017    Hysterectomy    KNEE ARTHROSCOPY W/ DEBRIDEMENT  09/25/2017    Arthroscopy Knee Right    LUMBAR LAMINECTOMY  09/25/2017    Laminectomy Lumbar    OTHER SURGICAL HISTORY  09/25/2017    Oophorectomy - Bilateral (Removal Of Both Ovaries)    OTHER SURGICAL HISTORY  05/13/2021    Knee replacement    TYMPANOSTOMY TUBE PLACEMENT  09/25/2017    Ear Pressure Equalization Tube     Family History   Adopted: Yes   Problem Relation Name Age of Onset    No Known Problems Mother          adopted    No Known Problems Father          adopted     Social History     Tobacco Use    Smoking status: Never    Smokeless tobacco: Never   Substance Use Topics    Alcohol use: Never    Drug use: Never       Physical Exam   ED Triage Vitals [06/07/24 2206]   Temperature Heart Rate Respirations BP   36.2 °C (97.2 °F) 85 18 (!) 189/85      Pulse Ox Temp Source Heart Rate Source Patient Position   98 % Temporal Monitor --      BP Location FiO2 (%)     --  --       Physical Exam    ED Course & MDM   Diagnoses as of 06/08/24 0003   Hypertension, unspecified type       Medical Decision Making  78-year-old female with hypertension, upper abdominal, lower chest tenderness, CT angio of the chest abdomen pelvis unremarkable, patient was given 5 mg of amlodipine, pressure 141/78, she is neurologically intact hemodynamically stable, nontoxic no evidence of acute coronary event or aortic aneurysm.  Outpatient follow-up with primary care provider recommended no evidence of acute surgical abdomen        Procedure  Procedures     George Oliver PA-C  06/07/24 2225       George Oliver PA-C  06/08/24 0005

## 2024-06-10 LAB
ATRIAL RATE: 81 BPM
P AXIS: 65 DEGREES
P OFFSET: 191 MS
P ONSET: 131 MS
PR INTERVAL: 196 MS
Q ONSET: 229 MS
QRS COUNT: 13 BEATS
QRS DURATION: 74 MS
QT INTERVAL: 364 MS
QTC CALCULATION(BAZETT): 422 MS
QTC FREDERICIA: 402 MS
R AXIS: 43 DEGREES
T AXIS: 52 DEGREES
T OFFSET: 411 MS
VENTRICULAR RATE: 81 BPM

## 2024-06-17 ENCOUNTER — APPOINTMENT (OUTPATIENT)
Dept: PRIMARY CARE | Facility: CLINIC | Age: 79
End: 2024-06-17
Payer: MEDICARE

## 2024-06-17 VITALS
BODY MASS INDEX: 32.78 KG/M2 | WEIGHT: 185 LBS | HEART RATE: 72 BPM | HEIGHT: 63 IN | SYSTOLIC BLOOD PRESSURE: 138 MMHG | DIASTOLIC BLOOD PRESSURE: 72 MMHG

## 2024-06-17 DIAGNOSIS — I10 BENIGN ESSENTIAL HYPERTENSION: Primary | ICD-10-CM

## 2024-06-17 DIAGNOSIS — J30.2 SEASONAL ALLERGIES: ICD-10-CM

## 2024-06-17 DIAGNOSIS — I10 HYPERTENSION, UNSPECIFIED TYPE: ICD-10-CM

## 2024-06-17 DIAGNOSIS — Z17.0 MALIGNANT NEOPLASM OF BREAST IN FEMALE, ESTROGEN RECEPTOR POSITIVE, UNSPECIFIED LATERALITY, UNSPECIFIED SITE OF BREAST (MULTI): ICD-10-CM

## 2024-06-17 DIAGNOSIS — C50.919 MALIGNANT NEOPLASM OF BREAST IN FEMALE, ESTROGEN RECEPTOR POSITIVE, UNSPECIFIED LATERALITY, UNSPECIFIED SITE OF BREAST (MULTI): ICD-10-CM

## 2024-06-17 PROBLEM — J18.9 PNEUMONIA: Status: ACTIVE | Noted: 2024-06-17

## 2024-06-17 PROBLEM — R07.9 CHEST PAIN: Status: ACTIVE | Noted: 2024-06-17

## 2024-06-17 PROBLEM — Z86.79 HISTORY OF HYPERTENSION: Status: ACTIVE | Noted: 2024-06-17

## 2024-06-17 PROBLEM — R10.13 EPIGASTRIC PAIN: Status: ACTIVE | Noted: 2024-06-17

## 2024-06-17 PROCEDURE — 3075F SYST BP GE 130 - 139MM HG: CPT | Performed by: INTERNAL MEDICINE

## 2024-06-17 PROCEDURE — 1160F RVW MEDS BY RX/DR IN RCRD: CPT | Performed by: INTERNAL MEDICINE

## 2024-06-17 PROCEDURE — 99213 OFFICE O/P EST LOW 20 MIN: CPT | Performed by: INTERNAL MEDICINE

## 2024-06-17 PROCEDURE — 1158F ADVNC CARE PLAN TLK DOCD: CPT | Performed by: INTERNAL MEDICINE

## 2024-06-17 PROCEDURE — 1123F ACP DISCUSS/DSCN MKR DOCD: CPT | Performed by: INTERNAL MEDICINE

## 2024-06-17 PROCEDURE — 3078F DIAST BP <80 MM HG: CPT | Performed by: INTERNAL MEDICINE

## 2024-06-17 PROCEDURE — 1159F MED LIST DOCD IN RCRD: CPT | Performed by: INTERNAL MEDICINE

## 2024-06-17 PROCEDURE — 1036F TOBACCO NON-USER: CPT | Performed by: INTERNAL MEDICINE

## 2024-06-17 RX ORDER — PREDNISOLONE ACETATE 10 MG/ML
1 SUSPENSION/ DROPS OPHTHALMIC
COMMUNITY
Start: 2024-02-16

## 2024-06-17 RX ORDER — FLUTICASONE PROPIONATE 50 MCG
1 SPRAY, SUSPENSION (ML) NASAL DAILY
Qty: 16 G | Refills: 11 | Status: SHIPPED | OUTPATIENT
Start: 2024-06-17 | End: 2025-06-17

## 2024-06-17 RX ORDER — AMLODIPINE BESYLATE 5 MG/1
5 TABLET ORAL DAILY
Qty: 90 TABLET | Refills: 1 | Status: SHIPPED | OUTPATIENT
Start: 2024-06-17 | End: 2024-12-14

## 2024-06-17 RX ORDER — METHYLPREDNISOLONE 4 MG/1
TABLET ORAL
Qty: 21 TABLET | Refills: 0 | Status: SHIPPED | OUTPATIENT
Start: 2024-06-17 | End: 2024-06-24

## 2024-06-17 ASSESSMENT — PATIENT HEALTH QUESTIONNAIRE - PHQ9
2. FEELING DOWN, DEPRESSED OR HOPELESS: NOT AT ALL
SUM OF ALL RESPONSES TO PHQ9 QUESTIONS 1 AND 2: 0
1. LITTLE INTEREST OR PLEASURE IN DOING THINGS: NOT AT ALL

## 2024-06-17 NOTE — PATIENT INSTRUCTIONS
Hypertension, bp well controlled on metoprolol and amlodipine 5mg daily  Continue to monitor home bp 2-3 times a week    Seasonal allergies  Continue zyrtec 10mg in the morning  Benadryl 25mg at bedtime  Take medrol nona/steroid, to decongest  Start fluticasone nasal spray, 2 puffs each nostril at bedtime

## 2024-06-17 NOTE — PROGRESS NOTES
"Subjective   Patient ID: Le Granados is a 78 y.o. female who presents for Hospital Follow-up.    She was in the ER twice  First was for vertigo, started while seated on the toilet, wasn't straining. She got sudden spinning sensation  Lasted a couple hours. Self resolved    The second visit, had head pressure and then checked bp, was 170s/108, then repeated was 200/100s  So went to the ER   She had taken a second metoprolol.  They started amlodipine in the ER    Home bp has been good since then 130s/         Review of Systems    Objective   /76   Pulse 72   Ht 1.6 m (5' 3\")   Wt 83.9 kg (185 lb)   BMI 32.77 kg/m²     Physical Exam  Constitutional:       Appearance: Normal appearance.   HENT:      Nose:      Comments: Turbs pale, blue, nearly occlusive, no purulence  Cardiovascular:      Rate and Rhythm: Normal rate and regular rhythm.      Heart sounds: No murmur heard.  Pulmonary:      Effort: Pulmonary effort is normal.      Breath sounds: Normal breath sounds.   Neurological:      Mental Status: She is alert.         Assessment/Plan          Patient Instructions   Hypertension, bp well controlled on metoprolol and amlodipine 5mg daily  Continue to monitor home bp 2-3 times a week    Seasonal allergies  Continue zyrtec 10mg in the morning  Benadryl 25mg at bedtime  Take medrol nona/steroid, to decongest  Start fluticasone nasal spray, 2 puffs each nostril at bedtime   "

## 2024-06-17 NOTE — PROGRESS NOTES
Subjective   Patient ID: Le Granados is a 78 y.o. female who presents for Hospital Follow-up.    HPI     Review of Systems    Objective   There were no vitals taken for this visit.    Physical Exam    Assessment/Plan

## 2024-06-19 ENCOUNTER — PATIENT OUTREACH (OUTPATIENT)
Dept: CARE COORDINATION | Facility: CLINIC | Age: 79
End: 2024-06-19
Payer: MEDICARE

## 2024-06-19 NOTE — PROGRESS NOTES
Received bell alert that patient went to ED with hypertension.   Has since seen he  primary care provider.  Discussed appointment, reviewed medications, and discussed plan of care.  Patient with no additional questions at this time. Patient is declining further outreaches at this time.     Plan of care below from Norma Rogel, DO   Hypertension, bp well controlled on metoprolol and amlodipine 5mg daily  Continue to monitor home bp 2-3 times a week     Seasonal allergies  Continue zyrtec 10mg in the morning  Benadryl 25mg at bedtime  Take medrol nona/steroid, to decongest  Start fluticasone nasal spray, 2 puffs each nostril at bedtime

## 2024-06-28 ENCOUNTER — HOSPITAL ENCOUNTER (OUTPATIENT)
Dept: CARDIOLOGY | Facility: HOSPITAL | Age: 79
Discharge: HOME | End: 2024-06-28
Payer: MEDICARE

## 2024-06-28 ENCOUNTER — APPOINTMENT (OUTPATIENT)
Dept: RADIOLOGY | Facility: HOSPITAL | Age: 79
End: 2024-06-28
Payer: MEDICARE

## 2024-06-28 ENCOUNTER — HOSPITAL ENCOUNTER (EMERGENCY)
Facility: HOSPITAL | Age: 79
Discharge: LEFT BEFORE DISCHARGE | End: 2024-06-29
Payer: MEDICARE

## 2024-06-28 VITALS
BODY MASS INDEX: 32.78 KG/M2 | OXYGEN SATURATION: 97 % | RESPIRATION RATE: 19 BRPM | WEIGHT: 185 LBS | DIASTOLIC BLOOD PRESSURE: 62 MMHG | HEART RATE: 78 BPM | HEIGHT: 63 IN | SYSTOLIC BLOOD PRESSURE: 117 MMHG

## 2024-06-28 DIAGNOSIS — R07.9 CHEST PAIN, UNSPECIFIED TYPE: Primary | ICD-10-CM

## 2024-06-28 LAB
ALBUMIN SERPL BCP-MCNC: 4.3 G/DL (ref 3.4–5)
ALP SERPL-CCNC: 68 U/L (ref 33–136)
ALT SERPL W P-5'-P-CCNC: 10 U/L (ref 7–45)
ANION GAP SERPL CALC-SCNC: 13 MMOL/L (ref 10–20)
AST SERPL W P-5'-P-CCNC: 16 U/L (ref 9–39)
BASOPHILS # BLD AUTO: 0.06 X10*3/UL (ref 0–0.1)
BASOPHILS NFR BLD AUTO: 0.8 %
BILIRUB SERPL-MCNC: 0.7 MG/DL (ref 0–1.2)
BUN SERPL-MCNC: 20 MG/DL (ref 6–23)
CALCIUM SERPL-MCNC: 9.3 MG/DL (ref 8.6–10.3)
CARDIAC TROPONIN I PNL SERPL HS: 4 NG/L (ref 0–13)
CARDIAC TROPONIN I PNL SERPL HS: 4 NG/L (ref 0–13)
CHLORIDE SERPL-SCNC: 103 MMOL/L (ref 98–107)
CO2 SERPL-SCNC: 26 MMOL/L (ref 21–32)
CREAT SERPL-MCNC: 0.78 MG/DL (ref 0.5–1.05)
EGFRCR SERPLBLD CKD-EPI 2021: 78 ML/MIN/1.73M*2
EOSINOPHIL # BLD AUTO: 0.22 X10*3/UL (ref 0–0.4)
EOSINOPHIL NFR BLD AUTO: 2.8 %
ERYTHROCYTE [DISTWIDTH] IN BLOOD BY AUTOMATED COUNT: 12.4 % (ref 11.5–14.5)
GLUCOSE SERPL-MCNC: 146 MG/DL (ref 74–99)
HCT VFR BLD AUTO: 42.5 % (ref 36–46)
HGB BLD-MCNC: 14.2 G/DL (ref 12–16)
IMM GRANULOCYTES # BLD AUTO: 0.03 X10*3/UL (ref 0–0.5)
IMM GRANULOCYTES NFR BLD AUTO: 0.4 % (ref 0–0.9)
LYMPHOCYTES # BLD AUTO: 2.17 X10*3/UL (ref 0.8–3)
LYMPHOCYTES NFR BLD AUTO: 27.4 %
MAGNESIUM SERPL-MCNC: 1.93 MG/DL (ref 1.6–2.4)
MCH RBC QN AUTO: 28.6 PG (ref 26–34)
MCHC RBC AUTO-ENTMCNC: 33.4 G/DL (ref 32–36)
MCV RBC AUTO: 86 FL (ref 80–100)
MONOCYTES # BLD AUTO: 0.82 X10*3/UL (ref 0.05–0.8)
MONOCYTES NFR BLD AUTO: 10.3 %
NEUTROPHILS # BLD AUTO: 4.63 X10*3/UL (ref 1.6–5.5)
NEUTROPHILS NFR BLD AUTO: 58.3 %
NRBC BLD-RTO: 0 /100 WBCS (ref 0–0)
PLATELET # BLD AUTO: 185 X10*3/UL (ref 150–450)
POTASSIUM SERPL-SCNC: 3.5 MMOL/L (ref 3.5–5.3)
PROT SERPL-MCNC: 6.8 G/DL (ref 6.4–8.2)
RBC # BLD AUTO: 4.97 X10*6/UL (ref 4–5.2)
SODIUM SERPL-SCNC: 138 MMOL/L (ref 136–145)
WBC # BLD AUTO: 7.9 X10*3/UL (ref 4.4–11.3)

## 2024-06-28 PROCEDURE — 84484 ASSAY OF TROPONIN QUANT: CPT | Mod: 91 | Performed by: PHYSICIAN ASSISTANT

## 2024-06-28 PROCEDURE — 99283 EMERGENCY DEPT VISIT LOW MDM: CPT | Mod: 25

## 2024-06-28 PROCEDURE — 93005 ELECTROCARDIOGRAM TRACING: CPT

## 2024-06-28 PROCEDURE — 84484 ASSAY OF TROPONIN QUANT: CPT | Performed by: PHYSICIAN ASSISTANT

## 2024-06-28 PROCEDURE — 85025 COMPLETE CBC W/AUTO DIFF WBC: CPT | Performed by: PHYSICIAN ASSISTANT

## 2024-06-28 PROCEDURE — 36415 COLL VENOUS BLD VENIPUNCTURE: CPT | Performed by: PHYSICIAN ASSISTANT

## 2024-06-28 PROCEDURE — 84075 ASSAY ALKALINE PHOSPHATASE: CPT | Performed by: PHYSICIAN ASSISTANT

## 2024-06-28 PROCEDURE — 71046 X-RAY EXAM CHEST 2 VIEWS: CPT

## 2024-06-28 PROCEDURE — 84460 ALANINE AMINO (ALT) (SGPT): CPT | Performed by: PHYSICIAN ASSISTANT

## 2024-06-28 PROCEDURE — 83735 ASSAY OF MAGNESIUM: CPT | Performed by: PHYSICIAN ASSISTANT

## 2024-06-28 PROCEDURE — 2500000001 HC RX 250 WO HCPCS SELF ADMINISTERED DRUGS (ALT 637 FOR MEDICARE OP): Performed by: PHYSICIAN ASSISTANT

## 2024-06-28 PROCEDURE — 71046 X-RAY EXAM CHEST 2 VIEWS: CPT | Mod: FOREIGN READ | Performed by: RADIOLOGY

## 2024-06-28 ASSESSMENT — LIFESTYLE VARIABLES
HAVE YOU EVER FELT YOU SHOULD CUT DOWN ON YOUR DRINKING: NO
TOTAL SCORE: 0
HAVE PEOPLE ANNOYED YOU BY CRITICIZING YOUR DRINKING: NO
EVER FELT BAD OR GUILTY ABOUT YOUR DRINKING: NO
EVER HAD A DRINK FIRST THING IN THE MORNING TO STEADY YOUR NERVES TO GET RID OF A HANGOVER: NO

## 2024-06-28 ASSESSMENT — PAIN DESCRIPTION - DESCRIPTORS: DESCRIPTORS: PRESSURE

## 2024-06-28 ASSESSMENT — HEART SCORE
HISTORY: HIGHLY SUSPICIOUS
TROPONIN: LESS THAN OR EQUAL TO NORMAL LIMIT
RISK FACTORS: 1-2 RISK FACTORS
ECG: NORMAL
HEART SCORE: 5
AGE: 65+

## 2024-06-28 ASSESSMENT — PAIN - FUNCTIONAL ASSESSMENT: PAIN_FUNCTIONAL_ASSESSMENT: 0-10

## 2024-06-28 ASSESSMENT — PAIN DESCRIPTION - PAIN TYPE: TYPE: ACUTE PAIN

## 2024-06-28 ASSESSMENT — PAIN DESCRIPTION - LOCATION: LOCATION: CHEST

## 2024-06-28 ASSESSMENT — PAIN SCALES - GENERAL: PAINLEVEL_OUTOF10: 5 - MODERATE PAIN

## 2024-06-29 NOTE — ED PROVIDER NOTES
HPI   Chief Complaint   Patient presents with    Chest Pain     Pt brought to ED for c/o of sudden onset of CP/ chest pressure with difficulty breathing. Pt reports it started suddenly 30-45 min prior to arrival. Pt denied fever, chills, numbness, tingling. She denied radiating pain. Pt is A&Ox4. EMS administered 324mg of aspirin and 1 nitroglycerin SL.        History of present illness:  78-year-old female presents emergency room for complaints of chest pain.  The patient states that she began developing chest pain earlier today states maybe a couple of hours ago and she states that 5 out of 10.  She states it is in the left upper side.  She states she has never had a heart disease before and states that she is scheduled to have a cardiac stress test next week.  She states she had 1 in the past but she believes it has been over 10 years since she last had 1.  She states that she takes medications for htn.  She denies any family history of heart disease and denies any shortness of breath or nausea.  She states that she has been feeling lightheaded though and feels like she might pass out when she stands up.  She states that she has no other symptoms at this time.  She was brought by EMS who gave her aspirin and a single dose of nitro.    Social history: Negative for alcohol and drug use.    Review of systems:   Gen.: No weight loss, fatigue, anorexia, insomnia, fever.   Eyes: No vision loss, double vision  ENT: No pharyngitis, neck pain  Cardiac: No  palpitations, syncope, near syncope.   Pulmonary: No shortness of breath, cough, hemoptysis.   Heme/lymph: No swollen glands, fever, bleeding.   GI: No abdominal pain, change in bowel habits, melena, hematemesis, hematochezia, nausea, vomiting, diarrhea.   : No discharge, dysuria, frequency, urgency, hematuria.   Musculoskeletal: No limb pain, joint pain, joint swelling.   Skin: No rashes.   Review of systems is otherwise negative unless stated above or in history of  present illness.      Physical exam:  General: Vitals noted, no distress. Afebrile.   EENT: No lymphadenopathy   Cardiac: Regular, rate, rhythm, no murmur.   Pulmonary: Lungs clear bilaterally with good aeration. No adventitious breath sounds.   Abdomen: Soft, nonsurgical. Nontender. No peritoneal signs. Normoactive bowel sounds.   Extremities: No peripheral edema.    Skin: No rash.   Neuro: No focal neurologic deficits      Medical decision making:   Testing: CBC CMP troponin x 2, BNP, EKG: Laboratory testing is markedly acute findings troponin x 2 was negative BNP was negative at this time, chest x-ray is unremarkable as interpreted by radiology    EKG taken on June 28, 2024 at 1931 shows rate of 88, occasional PVC, no STEMI no T wave inversion normal axis        Treatment: Patient was given Nitropaste at this time which completely alleviated her chest pain at this time.    Plan: 78-year-old female presents emergency room for complaints of chest pain.  The patient states that she began developing chest pain earlier today states maybe a couple of hours ago and she states that 5 out of 10.  She states it is in the left upper side.  She states she has never had a heart disease before and states that she is scheduled to have a cardiac stress test next week.  She states she had 1 in the past but she believes it has been over 10 years since she last had 1.  She states that she takes medications for htn.  She denies any family history of heart disease and denies any shortness of breath or nausea.  She states that she has been feeling lightheaded though and feels like she might pass out when she stands up.  She states that she has no other symptoms at this time.  She was brought by EMS who gave her aspirin and a single dose of nitro.  Normal S1-S2 heart sounds on exam lungs are clear to auscultation abdomen active bowel sounds throughout.  Patient initially tachycardic upon arrival but this resolved.  Laboratory testing  was reassuring at this time but the patient's heart score was 5.  Nitropaste alleviated her chest pain and went from a 5 to a 0 after application of Nitropaste.  Explained to the patient that I have concerns though about her chest pain as well as her heart score at this time.  I offered admission at this time but she declined stating that she wanted to go home and she felt that she could follow-up with her primary care doctor and possibly cardiologist next week.  She explained to me that she believes that she may have a stress test scheduled next week.  I attempted to reach out to the patient's cardiologist and made multiple attempts to contact him through their answering service as well as through his cell phone.  Unfortunate was able to contact him and then my calls were returned.  I spoke to patient about the possibility being admitted this time for further evaluation at this time but she declined.  We had a detailed discussion and explained to the patient that she would need to sign out AMA at this time as I was uncomfortable with discharging her home as I have concerns about the patient's heart score as well as improvement of the Nitropaste.  She verbalized understanding of this and explained to me that she understood the consequences and risks of doing so.  She explained to me that she would return in the event that her symptoms returned at this time and I encouraged her to please return if she changed her mind.  I explained to her that I would reach out to her in the event that her cardiologist responded to us as well.  The family is at bedside as well and this was also explained to him the all agreed.  The patient was signed out AMA at this time.  Impression:   1.  Chest pain            History provided by:  Patient   used: No                        Vahid Coma Scale Score: 15                     Patient History   Past Medical History:   Diagnosis Date    Breast cancer (Multi) 2021    lt  side    COVID-19 12/17/2020    COVID-19 virus infection    Encounter for other preprocedural examination 03/05/2021    Preop examination    Other conditions influencing health status 12/02/2020    History of cough    Personal history of malignant neoplasm of breast     History of malignant neoplasm of breast    Personal history of other diseases of the circulatory system     History of hypertension    Personal history of other diseases of the musculoskeletal system and connective tissue 09/21/2017    History of bone disorder    Personal history of other diseases of the nervous system and sense organs 03/05/2021    History of otitis media    Personal history of other diseases of the respiratory system     History of pneumothorax    Personal history of other specified conditions 02/23/2018    History of abdominal pain    Personal history of other specified conditions     History of chest pain     Past Surgical History:   Procedure Laterality Date    BREAST BIOPSY Right 2008    bgn    BREAST LUMPECTOMY  09/25/2017    Breast Surgery Lumpectomy    CARPAL TUNNEL RELEASE  09/25/2017    Neuroplasty Decompression Median Nerve At Carpal Tunnel    CATARACT EXTRACTION  09/25/2017    Cataract Extraction    CERVICAL FUSION  09/25/2017    Cervical Vertebral Fusion    HAND TENDON SURGERY  09/25/2017    Hand Incision Tendon Sheath Of A Finger    HYSTERECTOMY  09/25/2017    Hysterectomy    KNEE ARTHROSCOPY W/ DEBRIDEMENT  09/25/2017    Arthroscopy Knee Right    LUMBAR LAMINECTOMY  09/25/2017    Laminectomy Lumbar    OTHER SURGICAL HISTORY  09/25/2017    Oophorectomy - Bilateral (Removal Of Both Ovaries)    OTHER SURGICAL HISTORY  05/13/2021    Knee replacement    TYMPANOSTOMY TUBE PLACEMENT  09/25/2017    Ear Pressure Equalization Tube     Family History   Adopted: Yes   Problem Relation Name Age of Onset    No Known Problems Mother          adopted    No Known Problems Father          adopted     Social History     Tobacco Use     Smoking status: Never    Smokeless tobacco: Never   Substance Use Topics    Alcohol use: Never    Drug use: Never       Physical Exam   ED Triage Vitals   Temp Heart Rate Respirations BP   -- 06/28/24 1930 06/28/24 1930 06/28/24 1930    (!) 109 (!) 35 133/63      Pulse Ox Temp src Heart Rate Source Patient Position   06/28/24 1930 -- -- 06/28/24 1932   95 %   Sitting      BP Location FiO2 (%)     06/28/24 1932 --     Right arm        Physical Exam    ED Course & MDM   Diagnoses as of 06/29/24 0019   Chest pain, unspecified type       Medical Decision Making      Procedure  Procedures     Rohan Rebolledo PA-C  06/29/24 1130

## 2024-07-06 ENCOUNTER — APPOINTMENT (OUTPATIENT)
Dept: RADIOLOGY | Facility: HOSPITAL | Age: 79
End: 2024-07-06
Payer: MEDICARE

## 2024-07-06 ENCOUNTER — HOSPITAL ENCOUNTER (OUTPATIENT)
Facility: HOSPITAL | Age: 79
Setting detail: OBSERVATION
Discharge: HOME | End: 2024-07-07
Attending: INTERNAL MEDICINE | Admitting: INTERNAL MEDICINE
Payer: MEDICARE

## 2024-07-06 ENCOUNTER — APPOINTMENT (OUTPATIENT)
Dept: CARDIOLOGY | Facility: HOSPITAL | Age: 79
End: 2024-07-06
Payer: MEDICARE

## 2024-07-06 DIAGNOSIS — R42 DIZZY: ICD-10-CM

## 2024-07-06 DIAGNOSIS — R42 DIZZINESS: Primary | ICD-10-CM

## 2024-07-06 DIAGNOSIS — R42 VERTIGO: ICD-10-CM

## 2024-07-06 LAB
ALBUMIN SERPL BCP-MCNC: 4.3 G/DL (ref 3.4–5)
ALP SERPL-CCNC: 65 U/L (ref 33–136)
ALT SERPL W P-5'-P-CCNC: 10 U/L (ref 7–45)
ANION GAP SERPL CALC-SCNC: 18 MMOL/L (ref 10–20)
APPEARANCE UR: CLEAR
AST SERPL W P-5'-P-CCNC: 16 U/L (ref 9–39)
ATRIAL RATE: 88 BPM
BASOPHILS # BLD AUTO: 0.05 X10*3/UL (ref 0–0.1)
BASOPHILS NFR BLD AUTO: 0.7 %
BILIRUB SERPL-MCNC: 0.6 MG/DL (ref 0–1.2)
BILIRUB UR STRIP.AUTO-MCNC: NEGATIVE MG/DL
BNP SERPL-MCNC: 48 PG/ML (ref 0–99)
BUN SERPL-MCNC: 16 MG/DL (ref 6–23)
CALCIUM SERPL-MCNC: 9.5 MG/DL (ref 8.6–10.3)
CARDIAC TROPONIN I PNL SERPL HS: 3 NG/L (ref 0–13)
CARDIAC TROPONIN I PNL SERPL HS: 3 NG/L (ref 0–13)
CHLORIDE SERPL-SCNC: 98 MMOL/L (ref 98–107)
CO2 SERPL-SCNC: 26 MMOL/L (ref 21–32)
COLOR UR: ABNORMAL
CREAT SERPL-MCNC: 0.71 MG/DL (ref 0.5–1.05)
EGFRCR SERPLBLD CKD-EPI 2021: 87 ML/MIN/1.73M*2
EOSINOPHIL # BLD AUTO: 0.07 X10*3/UL (ref 0–0.4)
EOSINOPHIL NFR BLD AUTO: 1 %
ERYTHROCYTE [DISTWIDTH] IN BLOOD BY AUTOMATED COUNT: 12.4 % (ref 11.5–14.5)
GLUCOSE SERPL-MCNC: 141 MG/DL (ref 74–99)
GLUCOSE UR STRIP.AUTO-MCNC: NORMAL MG/DL
HCT VFR BLD AUTO: 43 % (ref 36–46)
HGB BLD-MCNC: 14.4 G/DL (ref 12–16)
IMM GRANULOCYTES # BLD AUTO: 0.03 X10*3/UL (ref 0–0.5)
IMM GRANULOCYTES NFR BLD AUTO: 0.4 % (ref 0–0.9)
INR PPP: 1 (ref 0.9–1.1)
KETONES UR STRIP.AUTO-MCNC: ABNORMAL MG/DL
LEUKOCYTE ESTERASE UR QL STRIP.AUTO: NEGATIVE
LYMPHOCYTES # BLD AUTO: 0.97 X10*3/UL (ref 0.8–3)
LYMPHOCYTES NFR BLD AUTO: 14.1 %
MAGNESIUM SERPL-MCNC: 1.85 MG/DL (ref 1.6–2.4)
MCH RBC QN AUTO: 28.6 PG (ref 26–34)
MCHC RBC AUTO-ENTMCNC: 33.5 G/DL (ref 32–36)
MCV RBC AUTO: 86 FL (ref 80–100)
MONOCYTES # BLD AUTO: 0.31 X10*3/UL (ref 0.05–0.8)
MONOCYTES NFR BLD AUTO: 4.5 %
NEUTROPHILS # BLD AUTO: 5.45 X10*3/UL (ref 1.6–5.5)
NEUTROPHILS NFR BLD AUTO: 79.3 %
NITRITE UR QL STRIP.AUTO: NEGATIVE
NRBC BLD-RTO: 0 /100 WBCS (ref 0–0)
P AXIS: 61 DEGREES
P OFFSET: 185 MS
P ONSET: 133 MS
PH UR STRIP.AUTO: 6.5 [PH]
PLATELET # BLD AUTO: 167 X10*3/UL (ref 150–450)
POTASSIUM SERPL-SCNC: 3.7 MMOL/L (ref 3.5–5.3)
PR INTERVAL: 180 MS
PROT SERPL-MCNC: 6.8 G/DL (ref 6.4–8.2)
PROT UR STRIP.AUTO-MCNC: NEGATIVE MG/DL
PROTHROMBIN TIME: 11.8 SECONDS (ref 9.8–12.8)
Q ONSET: 223 MS
QRS COUNT: 15 BEATS
QRS DURATION: 78 MS
QT INTERVAL: 356 MS
QTC CALCULATION(BAZETT): 430 MS
QTC FREDERICIA: 404 MS
R AXIS: 19 DEGREES
RBC # BLD AUTO: 5.03 X10*6/UL (ref 4–5.2)
RBC # UR STRIP.AUTO: NEGATIVE /UL
SODIUM SERPL-SCNC: 138 MMOL/L (ref 136–145)
SP GR UR STRIP.AUTO: 1.01
T AXIS: -8 DEGREES
T OFFSET: 401 MS
UROBILINOGEN UR STRIP.AUTO-MCNC: NORMAL MG/DL
VENTRICULAR RATE: 88 BPM
WBC # BLD AUTO: 6.9 X10*3/UL (ref 4.4–11.3)

## 2024-07-06 PROCEDURE — 85610 PROTHROMBIN TIME: CPT | Performed by: HEALTH CARE PROVIDER

## 2024-07-06 PROCEDURE — 84484 ASSAY OF TROPONIN QUANT: CPT | Performed by: HEALTH CARE PROVIDER

## 2024-07-06 PROCEDURE — 96374 THER/PROPH/DIAG INJ IV PUSH: CPT

## 2024-07-06 PROCEDURE — 96361 HYDRATE IV INFUSION ADD-ON: CPT

## 2024-07-06 PROCEDURE — 2500000005 HC RX 250 GENERAL PHARMACY W/O HCPCS

## 2024-07-06 PROCEDURE — 71045 X-RAY EXAM CHEST 1 VIEW: CPT | Performed by: RADIOLOGY

## 2024-07-06 PROCEDURE — 70450 CT HEAD/BRAIN W/O DYE: CPT

## 2024-07-06 PROCEDURE — 71045 X-RAY EXAM CHEST 1 VIEW: CPT

## 2024-07-06 PROCEDURE — 36415 COLL VENOUS BLD VENIPUNCTURE: CPT | Performed by: HEALTH CARE PROVIDER

## 2024-07-06 PROCEDURE — 70450 CT HEAD/BRAIN W/O DYE: CPT | Performed by: STUDENT IN AN ORGANIZED HEALTH CARE EDUCATION/TRAINING PROGRAM

## 2024-07-06 PROCEDURE — 99222 1ST HOSP IP/OBS MODERATE 55: CPT

## 2024-07-06 PROCEDURE — 93005 ELECTROCARDIOGRAM TRACING: CPT

## 2024-07-06 PROCEDURE — 80053 COMPREHEN METABOLIC PANEL: CPT | Performed by: HEALTH CARE PROVIDER

## 2024-07-06 PROCEDURE — G0378 HOSPITAL OBSERVATION PER HR: HCPCS

## 2024-07-06 PROCEDURE — 85025 COMPLETE CBC W/AUTO DIFF WBC: CPT | Performed by: HEALTH CARE PROVIDER

## 2024-07-06 PROCEDURE — 83880 ASSAY OF NATRIURETIC PEPTIDE: CPT | Performed by: HEALTH CARE PROVIDER

## 2024-07-06 PROCEDURE — 2500000001 HC RX 250 WO HCPCS SELF ADMINISTERED DRUGS (ALT 637 FOR MEDICARE OP): Performed by: HEALTH CARE PROVIDER

## 2024-07-06 PROCEDURE — 2500000004 HC RX 250 GENERAL PHARMACY W/ HCPCS (ALT 636 FOR OP/ED): Performed by: HEALTH CARE PROVIDER

## 2024-07-06 PROCEDURE — 2500000002 HC RX 250 W HCPCS SELF ADMINISTERED DRUGS (ALT 637 FOR MEDICARE OP, ALT 636 FOR OP/ED)

## 2024-07-06 PROCEDURE — 81003 URINALYSIS AUTO W/O SCOPE: CPT | Performed by: HEALTH CARE PROVIDER

## 2024-07-06 PROCEDURE — 83735 ASSAY OF MAGNESIUM: CPT | Performed by: HEALTH CARE PROVIDER

## 2024-07-06 PROCEDURE — 99285 EMERGENCY DEPT VISIT HI MDM: CPT | Mod: 25

## 2024-07-06 PROCEDURE — 2500000004 HC RX 250 GENERAL PHARMACY W/ HCPCS (ALT 636 FOR OP/ED)

## 2024-07-06 RX ORDER — METOPROLOL SUCCINATE 50 MG/1
50 TABLET, EXTENDED RELEASE ORAL DAILY
Status: DISCONTINUED | OUTPATIENT
Start: 2024-07-06 | End: 2024-07-07 | Stop reason: HOSPADM

## 2024-07-06 RX ORDER — ONDANSETRON HYDROCHLORIDE 2 MG/ML
4 INJECTION, SOLUTION INTRAVENOUS ONCE
Status: COMPLETED | OUTPATIENT
Start: 2024-07-06 | End: 2024-07-06

## 2024-07-06 RX ORDER — POLYETHYLENE GLYCOL 3350 17 G/17G
17 POWDER, FOR SOLUTION ORAL DAILY PRN
Status: DISCONTINUED | OUTPATIENT
Start: 2024-07-06 | End: 2024-07-07 | Stop reason: HOSPADM

## 2024-07-06 RX ORDER — PANTOPRAZOLE SODIUM 40 MG/1
40 TABLET, DELAYED RELEASE ORAL
Status: DISCONTINUED | OUTPATIENT
Start: 2024-07-07 | End: 2024-07-07 | Stop reason: HOSPADM

## 2024-07-06 RX ORDER — ACETAMINOPHEN 325 MG/1
650 TABLET ORAL EVERY 6 HOURS PRN
Status: DISCONTINUED | OUTPATIENT
Start: 2024-07-06 | End: 2024-07-07 | Stop reason: HOSPADM

## 2024-07-06 RX ORDER — MECLIZINE HYDROCHLORIDE 25 MG/1
25 TABLET ORAL ONCE
Status: COMPLETED | OUTPATIENT
Start: 2024-07-06 | End: 2024-07-06

## 2024-07-06 RX ORDER — ENOXAPARIN SODIUM 100 MG/ML
40 INJECTION SUBCUTANEOUS EVERY 24 HOURS
Status: DISCONTINUED | OUTPATIENT
Start: 2024-07-06 | End: 2024-07-07 | Stop reason: HOSPADM

## 2024-07-06 RX ORDER — PREDNISOLONE ACETATE 10 MG/ML
1 SUSPENSION/ DROPS OPHTHALMIC NIGHTLY
Status: DISCONTINUED | OUTPATIENT
Start: 2024-07-06 | End: 2024-07-07 | Stop reason: HOSPADM

## 2024-07-06 RX ORDER — ONDANSETRON HYDROCHLORIDE 2 MG/ML
4 INJECTION, SOLUTION INTRAVENOUS EVERY 6 HOURS PRN
Status: DISCONTINUED | OUTPATIENT
Start: 2024-07-06 | End: 2024-07-07 | Stop reason: HOSPADM

## 2024-07-06 RX ORDER — FLUTICASONE PROPIONATE 50 MCG
1 SPRAY, SUSPENSION (ML) NASAL DAILY
Status: DISCONTINUED | OUTPATIENT
Start: 2024-07-06 | End: 2024-07-07 | Stop reason: HOSPADM

## 2024-07-06 RX ORDER — AMLODIPINE BESYLATE 5 MG/1
5 TABLET ORAL DAILY
Status: DISCONTINUED | OUTPATIENT
Start: 2024-07-06 | End: 2024-07-07 | Stop reason: HOSPADM

## 2024-07-06 RX ORDER — MECLIZINE HYDROCHLORIDE 25 MG/1
25 TABLET ORAL 3 TIMES DAILY PRN
Status: DISCONTINUED | OUTPATIENT
Start: 2024-07-06 | End: 2024-07-07 | Stop reason: HOSPADM

## 2024-07-06 RX ORDER — CETIRIZINE HYDROCHLORIDE 10 MG/1
10 TABLET ORAL DAILY
Status: DISCONTINUED | OUTPATIENT
Start: 2024-07-06 | End: 2024-07-07 | Stop reason: HOSPADM

## 2024-07-06 RX ADMIN — ONDANSETRON 4 MG: 2 INJECTION INTRAMUSCULAR; INTRAVENOUS at 15:24

## 2024-07-06 RX ADMIN — MECLIZINE HYDROCHLORIDE 25 MG: 25 TABLET ORAL at 15:24

## 2024-07-06 RX ADMIN — FLUTICASONE PROPIONATE 1 SPRAY: 50 SPRAY, METERED NASAL at 20:25

## 2024-07-06 RX ADMIN — SODIUM CHLORIDE, POTASSIUM CHLORIDE, SODIUM LACTATE AND CALCIUM CHLORIDE 1000 ML: 600; 310; 30; 20 INJECTION, SOLUTION INTRAVENOUS at 15:25

## 2024-07-06 RX ADMIN — PREDNISONE 50 MG: 20 TABLET ORAL at 20:25

## 2024-07-06 RX ADMIN — PREDNISOLONE ACETATE 1 DROP: 10 SUSPENSION/ DROPS OPHTHALMIC at 21:28

## 2024-07-06 SDOH — SOCIAL STABILITY: SOCIAL INSECURITY: DOES ANYONE TRY TO KEEP YOU FROM HAVING/CONTACTING OTHER FRIENDS OR DOING THINGS OUTSIDE YOUR HOME?: NO

## 2024-07-06 SDOH — SOCIAL STABILITY: SOCIAL INSECURITY: ABUSE: ADULT

## 2024-07-06 SDOH — ECONOMIC STABILITY: INCOME INSECURITY: IN THE LAST 12 MONTHS, WAS THERE A TIME WHEN YOU WERE NOT ABLE TO PAY THE MORTGAGE OR RENT ON TIME?: NO

## 2024-07-06 SDOH — ECONOMIC STABILITY: HOUSING INSECURITY
IN THE LAST 12 MONTHS, WAS THERE A TIME WHEN YOU DID NOT HAVE A STEADY PLACE TO SLEEP OR SLEPT IN A SHELTER (INCLUDING NOW)?: NO

## 2024-07-06 SDOH — ECONOMIC STABILITY: FOOD INSECURITY: WITHIN THE PAST 12 MONTHS, THE FOOD YOU BOUGHT JUST DIDN'T LAST AND YOU DIDN'T HAVE MONEY TO GET MORE.: NEVER TRUE

## 2024-07-06 SDOH — HEALTH STABILITY: MENTAL HEALTH
HOW OFTEN DO YOU NEED TO HAVE SOMEONE HELP YOU WHEN YOU READ INSTRUCTIONS, PAMPHLETS, OR OTHER WRITTEN MATERIAL FROM YOUR DOCTOR OR PHARMACY?: NEVER

## 2024-07-06 SDOH — SOCIAL STABILITY: SOCIAL INSECURITY: DO YOU FEEL ANYONE HAS EXPLOITED OR TAKEN ADVANTAGE OF YOU FINANCIALLY OR OF YOUR PERSONAL PROPERTY?: NO

## 2024-07-06 SDOH — HEALTH STABILITY: PHYSICAL HEALTH: ON AVERAGE, HOW MANY DAYS PER WEEK DO YOU ENGAGE IN MODERATE TO STRENUOUS EXERCISE (LIKE A BRISK WALK)?: 5 DAYS

## 2024-07-06 SDOH — SOCIAL STABILITY: SOCIAL NETWORK: ARE YOU MARRIED, WIDOWED, DIVORCED, SEPARATED, NEVER MARRIED, OR LIVING WITH A PARTNER?: MARRIED

## 2024-07-06 SDOH — HEALTH STABILITY: MENTAL HEALTH
STRESS IS WHEN SOMEONE FEELS TENSE, NERVOUS, ANXIOUS, OR CAN'T SLEEP AT NIGHT BECAUSE THEIR MIND IS TROUBLED. HOW STRESSED ARE YOU?: NOT AT ALL

## 2024-07-06 SDOH — SOCIAL STABILITY: SOCIAL INSECURITY: WITHIN THE LAST YEAR, HAVE YOU BEEN AFRAID OF YOUR PARTNER OR EX-PARTNER?: NO

## 2024-07-06 SDOH — ECONOMIC STABILITY: FOOD INSECURITY: WITHIN THE PAST 12 MONTHS, YOU WORRIED THAT YOUR FOOD WOULD RUN OUT BEFORE YOU GOT MONEY TO BUY MORE.: NEVER TRUE

## 2024-07-06 SDOH — SOCIAL STABILITY: SOCIAL INSECURITY
WITHIN THE LAST YEAR, HAVE TO BEEN RAPED OR FORCED TO HAVE ANY KIND OF SEXUAL ACTIVITY BY YOUR PARTNER OR EX-PARTNER?: NO

## 2024-07-06 SDOH — ECONOMIC STABILITY: INCOME INSECURITY: HOW HARD IS IT FOR YOU TO PAY FOR THE VERY BASICS LIKE FOOD, HOUSING, MEDICAL CARE, AND HEATING?: NOT HARD AT ALL

## 2024-07-06 SDOH — ECONOMIC STABILITY: HOUSING INSECURITY: IN THE LAST 12 MONTHS, HOW MANY PLACES HAVE YOU LIVED?: 1

## 2024-07-06 SDOH — SOCIAL STABILITY: SOCIAL NETWORK
DO YOU BELONG TO ANY CLUBS OR ORGANIZATIONS SUCH AS CHURCH GROUPS UNIONS, FRATERNAL OR ATHLETIC GROUPS, OR SCHOOL GROUPS?: YES

## 2024-07-06 SDOH — SOCIAL STABILITY: SOCIAL INSECURITY
WITHIN THE LAST YEAR, HAVE YOU BEEN KICKED, HIT, SLAPPED, OR OTHERWISE PHYSICALLY HURT BY YOUR PARTNER OR EX-PARTNER?: NO

## 2024-07-06 SDOH — ECONOMIC STABILITY: TRANSPORTATION INSECURITY
IN THE PAST 12 MONTHS, HAS LACK OF TRANSPORTATION KEPT YOU FROM MEETINGS, WORK, OR FROM GETTING THINGS NEEDED FOR DAILY LIVING?: NO

## 2024-07-06 SDOH — SOCIAL STABILITY: SOCIAL NETWORK: HOW OFTEN DO YOU ATTEND CHURCH OR RELIGIOUS SERVICES?: 1 TO 4 TIMES PER YEAR

## 2024-07-06 SDOH — SOCIAL STABILITY: SOCIAL INSECURITY: WITHIN THE LAST YEAR, HAVE YOU BEEN HUMILIATED OR EMOTIONALLY ABUSED IN OTHER WAYS BY YOUR PARTNER OR EX-PARTNER?: NO

## 2024-07-06 SDOH — SOCIAL STABILITY: SOCIAL NETWORK
IN A TYPICAL WEEK, HOW MANY TIMES DO YOU TALK ON THE PHONE WITH FAMILY, FRIENDS, OR NEIGHBORS?: MORE THAN THREE TIMES A WEEK

## 2024-07-06 SDOH — ECONOMIC STABILITY: TRANSPORTATION INSECURITY
IN THE PAST 12 MONTHS, HAS THE LACK OF TRANSPORTATION KEPT YOU FROM MEDICAL APPOINTMENTS OR FROM GETTING MEDICATIONS?: NO

## 2024-07-06 SDOH — SOCIAL STABILITY: SOCIAL INSECURITY: ARE YOU OR HAVE YOU BEEN THREATENED OR ABUSED PHYSICALLY, EMOTIONALLY, OR SEXUALLY BY ANYONE?: NO

## 2024-07-06 SDOH — HEALTH STABILITY: PHYSICAL HEALTH: ON AVERAGE, HOW MANY MINUTES DO YOU ENGAGE IN EXERCISE AT THIS LEVEL?: 140 MIN

## 2024-07-06 SDOH — SOCIAL STABILITY: SOCIAL INSECURITY: HAVE YOU HAD ANY THOUGHTS OF HARMING ANYONE ELSE?: NO

## 2024-07-06 SDOH — SOCIAL STABILITY: SOCIAL INSECURITY: ARE THERE ANY APPARENT SIGNS OF INJURIES/BEHAVIORS THAT COULD BE RELATED TO ABUSE/NEGLECT?: NO

## 2024-07-06 SDOH — ECONOMIC STABILITY: INCOME INSECURITY: IN THE PAST 12 MONTHS, HAS THE ELECTRIC, GAS, OIL, OR WATER COMPANY THREATENED TO SHUT OFF SERVICE IN YOUR HOME?: NO

## 2024-07-06 SDOH — SOCIAL STABILITY: SOCIAL INSECURITY: HAVE YOU HAD THOUGHTS OF HARMING ANYONE ELSE?: NO

## 2024-07-06 SDOH — SOCIAL STABILITY: SOCIAL NETWORK: HOW OFTEN DO YOU ATTENT MEETINGS OF THE CLUB OR ORGANIZATION YOU BELONG TO?: MORE THAN 4 TIMES PER YEAR

## 2024-07-06 SDOH — SOCIAL STABILITY: SOCIAL INSECURITY: HAS ANYONE EVER THREATENED TO HURT YOUR FAMILY OR YOUR PETS?: NO

## 2024-07-06 SDOH — SOCIAL STABILITY: SOCIAL NETWORK: HOW OFTEN DO YOU GET TOGETHER WITH FRIENDS OR RELATIVES?: THREE TIMES A WEEK

## 2024-07-06 SDOH — SOCIAL STABILITY: SOCIAL INSECURITY: DO YOU FEEL UNSAFE GOING BACK TO THE PLACE WHERE YOU ARE LIVING?: NO

## 2024-07-06 ASSESSMENT — ENCOUNTER SYMPTOMS
AGITATION: 0
BACK PAIN: 0
ABDOMINAL PAIN: 0
VOMITING: 0
DIZZINESS: 1
PALPITATIONS: 0
CHILLS: 0
ABDOMINAL DISTENTION: 0
COLOR CHANGE: 0
HALLUCINATIONS: 0
NUMBNESS: 0
NECK PAIN: 0
CONFUSION: 0
HEADACHES: 0
SHORTNESS OF BREATH: 0
APNEA: 0
TREMORS: 0
LIGHT-HEADEDNESS: 1
FACIAL ASYMMETRY: 0
EYE DISCHARGE: 0
WHEEZING: 0
EYE REDNESS: 0
EYE PAIN: 0
WEAKNESS: 0
FEVER: 0
SPEECH DIFFICULTY: 0
CONSTIPATION: 0
NAUSEA: 1
DIFFICULTY URINATING: 0
FLANK PAIN: 0
FATIGUE: 0
NECK STIFFNESS: 0
CHEST TIGHTNESS: 0

## 2024-07-06 ASSESSMENT — COGNITIVE AND FUNCTIONAL STATUS - GENERAL
MOBILITY SCORE: 24
PATIENT BASELINE BEDBOUND: NO
MOBILITY SCORE: 24
DAILY ACTIVITIY SCORE: 24
DAILY ACTIVITIY SCORE: 24
MOBILITY SCORE: 24
DAILY ACTIVITIY SCORE: 24

## 2024-07-06 ASSESSMENT — ACTIVITIES OF DAILY LIVING (ADL)
ASSISTIVE_DEVICE: DENTURES LOWER;DENTURES UPPER
HEARING - LEFT EAR: HEARING AID
JUDGMENT_ADEQUATE_SAFELY_COMPLETE_DAILY_ACTIVITIES: YES
WALKS IN HOME: INDEPENDENT
FEEDING YOURSELF: INDEPENDENT
GROOMING: INDEPENDENT
PATIENT'S MEMORY ADEQUATE TO SAFELY COMPLETE DAILY ACTIVITIES?: YES
HEARING - RIGHT EAR: HEARING AID
BATHING: INDEPENDENT
DRESSING YOURSELF: INDEPENDENT
ADEQUATE_TO_COMPLETE_ADL: YES
TOILETING: INDEPENDENT

## 2024-07-06 ASSESSMENT — LIFESTYLE VARIABLES
EVER HAD A DRINK FIRST THING IN THE MORNING TO STEADY YOUR NERVES TO GET RID OF A HANGOVER: NO
PRESCIPTION_ABUSE_PAST_12_MONTHS: NO
TOTAL SCORE: 0
AUDIT-C TOTAL SCORE: 0
HAVE PEOPLE ANNOYED YOU BY CRITICIZING YOUR DRINKING: NO
AUDIT-C TOTAL SCORE: 0
HOW OFTEN DO YOU HAVE 6 OR MORE DRINKS ON ONE OCCASION: NEVER
SUBSTANCE_ABUSE_PAST_12_MONTHS: NO
HOW OFTEN DO YOU HAVE A DRINK CONTAINING ALCOHOL: NEVER
SKIP TO QUESTIONS 9-10: 1
HOW MANY STANDARD DRINKS CONTAINING ALCOHOL DO YOU HAVE ON A TYPICAL DAY: PATIENT DOES NOT DRINK
HAVE YOU EVER FELT YOU SHOULD CUT DOWN ON YOUR DRINKING: NO
EVER FELT BAD OR GUILTY ABOUT YOUR DRINKING: NO

## 2024-07-06 ASSESSMENT — PATIENT HEALTH QUESTIONNAIRE - PHQ9
1. LITTLE INTEREST OR PLEASURE IN DOING THINGS: NOT AT ALL
2. FEELING DOWN, DEPRESSED OR HOPELESS: NOT AT ALL
SUM OF ALL RESPONSES TO PHQ9 QUESTIONS 1 & 2: 0

## 2024-07-06 ASSESSMENT — PAIN - FUNCTIONAL ASSESSMENT
PAIN_FUNCTIONAL_ASSESSMENT: 0-10
PAIN_FUNCTIONAL_ASSESSMENT: 0-10

## 2024-07-06 ASSESSMENT — COLUMBIA-SUICIDE SEVERITY RATING SCALE - C-SSRS
6. HAVE YOU EVER DONE ANYTHING, STARTED TO DO ANYTHING, OR PREPARED TO DO ANYTHING TO END YOUR LIFE?: NO
2. HAVE YOU ACTUALLY HAD ANY THOUGHTS OF KILLING YOURSELF?: NO
1. IN THE PAST MONTH, HAVE YOU WISHED YOU WERE DEAD OR WISHED YOU COULD GO TO SLEEP AND NOT WAKE UP?: NO

## 2024-07-06 ASSESSMENT — PAIN SCALES - GENERAL
PAINLEVEL_OUTOF10: 0 - NO PAIN
PAINLEVEL_OUTOF10: 0 - NO PAIN

## 2024-07-06 NOTE — ED PROVIDER NOTES
HPI   Chief Complaint   Patient presents with    Dizziness       CC: vertigo  HPI:   78-year-old female presents to ED complaining of acute onset of nausea vomiting, dizziness, she has a history of hypertension, breast cancer, status postlumpectomy, radiation therapy, no chemo, in remission, patient denies having any chest pain or shortness of breath, she reports symptoms started abruptly this morning shortly after she was eating breakfast, she was sitting down when this occurred.  Patient was admitted on 5/29/2024 for similar symptoms she had echo, CT head, MRI's with no concerning findings at that time.  She unfortunately was not evaluated by neurology.      Additional Limitations to History:   External Records Reviewed: I reviewed recent and relevant outside records including   History Obtained From:     Past Medical History: Per HPI  Medications: Reviewed in EMR and with patient  Allergies:  Reviewed in EMR  Past Surgical History:   Social History:     ------------------------------------------------------------------------------------------------------  Physical Exam:  --Vital signs reviewed in nursing triage note, EMR flow sheets, and at patient's bedside  GEN:  A&Ox3, no acute distress, appears comfortable.  Conversational and appropriate.  No confusion or gross mental status changes.  EYES: EOMI, non-injected sclera.  Unidirectional nystagmus towards the right  ENT: Moist mucous membranes, no apparent injuries or lesions.   CARDIO: Normal rate and regular rhythm. No murmurs, rubs, or gallops.  2+ equal pulses of the distal extremities.   PULM: Clear to auscultation bilaterally. No rales, rhonchi, or wheezes. Good symmetric chest expansion.  GI: Soft, non-tender, non-distended. No rebound tenderness or guarding.  SKIN: Warm and dry, no rashes or lesions.  MSK: ROM intact the extremities without contractures.   EXT: No peripheral edema, contusions, or wounds.   NEURO: Cranial nerves II-XII grossly intact.  Sensation to light touch intact and equal bilaterally in upper and lower extremities.  Symmetric 5/5 strength in upper and lower extremities.  Negative test of skew no vertical or rotary nystagmus NIH score 0  PSYCH: Appropriate mood and behavior, converses and responds appropriately during exam.  -------------------------------------------------------------------------------------------------------      Differential Diagnoses Considered:   Chronic Medical Conditions Significantly Affecting Care:   Diagnostic testing considered: [PERC, D-Dimer, PECARN, etc.]    - EKG interpreted by myself sinus rhythm first-degree AV block ventricular rate 80 IL interval 226 normal QRS duration no prolonged QT/QTc no obvious ST elevation, depression, no acute ischemic findings  - I independently interpreted: [CXR, CT, POCUS, etc. including your interpretation]  - Labs notable for     Escalation of Care: Appropriate for   Social Determinants of Health Significantly Affecting Care: [Homelessness, lacking transportation, uninsured, unable to afford medications]  Prescription Drug Consideration: [Antibiotics, antivirals, pain medications, etc.]  Discussion of Management with Other Providers:  I discussed the patient/results with: [admitting team, consultant, radiologist, social work, EPAT, case management, PT/OT, RT, PCP, etc.]      George Oliver PA-C                          Vahid Coma Scale Score: 15                     Patient History   Past Medical History:   Diagnosis Date    Breast cancer (Multi) 2021    lt side    COVID-19 12/17/2020    COVID-19 virus infection    Encounter for other preprocedural examination 03/05/2021    Preop examination    Other conditions influencing health status 12/02/2020    History of cough    Personal history of malignant neoplasm of breast     History of malignant neoplasm of breast    Personal history of other diseases of the circulatory system     History of hypertension    Personal history of  other diseases of the musculoskeletal system and connective tissue 09/21/2017    History of bone disorder    Personal history of other diseases of the nervous system and sense organs 03/05/2021    History of otitis media    Personal history of other diseases of the respiratory system     History of pneumothorax    Personal history of other specified conditions 02/23/2018    History of abdominal pain    Personal history of other specified conditions     History of chest pain     Past Surgical History:   Procedure Laterality Date    BREAST BIOPSY Right 2008    bgn    BREAST LUMPECTOMY  09/25/2017    Breast Surgery Lumpectomy    CARPAL TUNNEL RELEASE  09/25/2017    Neuroplasty Decompression Median Nerve At Carpal Tunnel    CATARACT EXTRACTION  09/25/2017    Cataract Extraction    CERVICAL FUSION  09/25/2017    Cervical Vertebral Fusion    HAND TENDON SURGERY  09/25/2017    Hand Incision Tendon Sheath Of A Finger    HYSTERECTOMY  09/25/2017    Hysterectomy    KNEE ARTHROSCOPY W/ DEBRIDEMENT  09/25/2017    Arthroscopy Knee Right    LUMBAR LAMINECTOMY  09/25/2017    Laminectomy Lumbar    OTHER SURGICAL HISTORY  09/25/2017    Oophorectomy - Bilateral (Removal Of Both Ovaries)    OTHER SURGICAL HISTORY  05/13/2021    Knee replacement    TYMPANOSTOMY TUBE PLACEMENT  09/25/2017    Ear Pressure Equalization Tube     Family History   Adopted: Yes   Problem Relation Name Age of Onset    No Known Problems Mother          adopted    No Known Problems Father          adopted     Social History     Tobacco Use    Smoking status: Never    Smokeless tobacco: Never   Substance Use Topics    Alcohol use: Never    Drug use: Never       Physical Exam   ED Triage Vitals [07/06/24 1511]   Temperature Heart Rate Respirations BP   36.4 °C (97.5 °F) 87 19 140/67      Pulse Ox Temp src Heart Rate Source Patient Position   98 % -- -- --      BP Location FiO2 (%)     -- --       Physical Exam    ED Course & MDM   Diagnoses as of 07/06/24 0707    Vertigo       Medical Decision Making  78-year-old female with acute onset of vertigo, nausea vomiting, high suspicion for acute vestibular neuritis, other possible causes include Ménière's, benign positional vertigo, posterior CVA however low suspicion for CVA given recent MRI, NIH score is 0, she had some improvement with meclizine and Zofran, however patient still unsteady and not able to ambulate.  Patient will be admitted observation at this time for further neuro consult        Procedure  Procedures     George Oliver PA-C  07/06/24 8064

## 2024-07-06 NOTE — CARE PLAN
The patient's goals for the shift include      The clinical goals for the shift include pt will not fall    Over the shift, the patient did not make progress toward the following goals. Barriers to progression include ***. Recommendations to address these barriers include ***.

## 2024-07-06 NOTE — ED TRIAGE NOTES
Patient from home having c/o dizziness. Patient given 4mg odt zofran by ems. Patient here recently with dizziness. Work up was negative and patient discharged. Today around 1030 patient began to have worsening dizziness and nausea. Patient A&Ox4

## 2024-07-06 NOTE — CARE PLAN
Problem: Fall/Injury  Goal: Not fall by end of shift  Outcome: Progressing  Goal: Be free from injury by end of the shift  Outcome: Progressing     Problem: Pain - Adult  Goal: Verbalizes/displays adequate comfort level or baseline comfort level  Outcome: Progressing     Problem: Safety - Adult  Goal: Free from fall injury  Outcome: Progressing     Problem: General Stroke  Goal: Maintain BP within ordered limits throughout shift  Outcome: Progressing  Goal: No symptoms of aspiration throughout shift  Outcome: Progressing   The patient's goals for the shift include  to find out why she's so dizzy    The clinical goals for the shift include pt will not fall    Over the shift, the patient did not make progress toward the following goals. Barriers to progression include none. Recommendations to address these barriers include none.

## 2024-07-06 NOTE — H&P
Internal Medicine - History and Physical Note      Patient: Le Granados, Age: 78 y.o., SEX: female , MRN:[id number], ROOM:[facility],  Code: Full Code   Admitted On: 7/6/2024   Admitting Dx: No admission diagnoses are documented for this encounter.  PCP: Norma Rogel DO        Attending: Ramses Mendoza DO        Chief Complaint   Patient: Le Granados is a 78 y.o. female who presented to the hospital for   Chief Complaint   Patient presents with    Dizziness       HPI   Le Granados is a 78 y.o. year old female  patient with pmh significant for HTN, esophageal dysplasia, and Breast Cancer s/p radiation and lumpectomy presenting for dizziness. Patient was recently admitted for similar symptoms on 5/29. Echo, CT head, and MRI had no concerning findings at the time. Dizziness began suddenly at 10:30 am the day of admission. Patient describes the sensation like the room was spinning. She endorsed nausea without emesis and denied fever, feelings of flu-like symptoms, changes in hearing, chest pain, or headaches. Currently, the patient is not dyspneic, but she had some mild shortness of breath when the dizziness was at its most severe. Patient endorses some blurry vision with the dizziness as well.At it's most severe, the patient rated the dizziness as a 10, currently she says it is a 4. Patient is resting comfortably and will be transferred to general floor.    ROS  Review of Systems   Constitutional:  Negative for chills, fatigue and fever.   HENT:  Negative for hearing loss.         Patient wears bilateral hearing aids but their has been no acute change in her hearing   Eyes:  Positive for visual disturbance. Negative for pain, discharge and redness.        Blurry vision while dizziness occured   Respiratory:  Negative for apnea, chest tightness, shortness of breath and wheezing.    Cardiovascular:  Negative for chest pain, palpitations and leg swelling.   Gastrointestinal:  Positive for nausea. Negative  for abdominal distention, abdominal pain, constipation and vomiting.   Genitourinary:  Negative for difficulty urinating, dyspareunia, enuresis and flank pain.   Musculoskeletal:  Positive for gait problem. Negative for back pain, neck pain and neck stiffness.        Difficulty ambulating due to dizziness     Skin:  Negative for color change, pallor and rash.   Neurological:  Positive for dizziness and light-headedness. Negative for tremors, syncope, facial asymmetry, speech difficulty, weakness, numbness and headaches.   Psychiatric/Behavioral:  Negative for agitation, behavioral problems, confusion and hallucinations.         12 Point ROS negative unless otherwise specified above.     ED COURSE:   VS On Presentation: Stable Temperature 97.5F , /67 , heart rate 87 , respiration 17 , O2 sat 98% RA    Labs  - CBC: Unremarkable  - BMP: K 3.7, Glucose 141, otherwise unremarkable  - LFTs: Unremarkable  - ABGs:   - UA positive for: 1+ ketones  - Blood cultures: None indicated  - Urine cultures: None indicated  - M.85  -Troponin: Unremarkable  -BNP: Unremarkable    Imaging:  CXR:Mild pulmonary edema. Enlarged cardiac silhouette.     Interventions: Received Zofran, Meclizine, and 1L LR bolus in the ED      Past Medical History: HTN, Breast Cancer s/p lumpectomy and radiation  Past Surgical History: Breast Cancer Lumpectomy  Medication Allergies: Azithromycin, Clindamycin, Penicillins, Tetracyclines, Bactrim, Clarithromycin, Simvastatin  Family History: None  Home Meds: Amlodipine, Fluticasone, Meclizine, Metoprolol Succinate, Multivitamin, Omeprazole, Prednisolone Acetate, Epi-pen PRN, Cetirizine    Social History:  - Coming from [place]  - Tobacco: Former smoker, quit >20 years ago  - Alcohol: Rarely  - Illicit Drug: None    HealthCare Providers:  - PCP: Norma Rogel,      Code Status: Full Code     Emergency contact: Extended Emergency Contact Information  Primary Emergency Contact: Rajan Granados  Phone: [phone]  Work Phone: [phone]  Relation: Spouse  Secondary Emergency Contact: JUAN DANIEL ARROYO  Mobile Phone: [phone]  Relation: Daughter  Preferred language: English   needed? No     Past Medical History     Past Medical History:   Diagnosis Date    Breast cancer (Multi) 2021    lt side    COVID-19 12/17/2020    COVID-19 virus infection    Encounter for other preprocedural examination 03/05/2021    Preop examination    Other conditions influencing health status 12/02/2020    History of cough    Personal history of malignant neoplasm of breast     History of malignant neoplasm of breast    Personal history of other diseases of the circulatory system     History of hypertension    Personal history of other diseases of the musculoskeletal system and connective tissue 09/21/2017    History of bone disorder    Personal history of other diseases of the nervous system and sense organs 03/05/2021    History of otitis media    Personal history of other diseases of the respiratory system     History of pneumothorax    Personal history of other specified conditions 02/23/2018    History of abdominal pain    Personal history of other specified conditions     History of chest pain        Surgical History     Past Surgical History:   Procedure Laterality Date    BREAST BIOPSY Right 2008    bgn    BREAST LUMPECTOMY  09/25/2017    Breast Surgery Lumpectomy    CARPAL TUNNEL RELEASE  09/25/2017    Neuroplasty Decompression Median Nerve At Carpal Tunnel    CATARACT EXTRACTION  09/25/2017    Cataract Extraction    CERVICAL FUSION  09/25/2017    Cervical Vertebral Fusion    HAND TENDON SURGERY  09/25/2017    Hand Incision Tendon Sheath Of A Finger    HYSTERECTOMY  09/25/2017    Hysterectomy    KNEE ARTHROSCOPY W/ DEBRIDEMENT  09/25/2017    Arthroscopy Knee Right    LUMBAR LAMINECTOMY  09/25/2017    Laminectomy Lumbar    OTHER SURGICAL HISTORY  09/25/2017    Oophorectomy - Bilateral (Removal Of Both  Ovaries)    OTHER SURGICAL HISTORY  05/13/2021    Knee replacement    TYMPANOSTOMY TUBE PLACEMENT  09/25/2017    Ear Pressure Equalization Tube       Family History     Family History   Adopted: Yes   Problem Relation Name Age of Onset    No Known Problems Mother          adopted    No Known Problems Father          adopted       Social History     Social History     Socioeconomic History    Marital status:      Spouse name: Not on file    Number of children: Not on file    Years of education: Not on file    Highest education level: Not on file   Occupational History    Not on file   Tobacco Use    Smoking status: Never    Smokeless tobacco: Never   Substance and Sexual Activity    Alcohol use: Never    Drug use: Never    Sexual activity: Not on file   Other Topics Concern    Not on file   Social History Narrative    Not on file     Social Determinants of Health     Financial Resource Strain: Low Risk  (5/29/2024)    Overall Financial Resource Strain (CARDIA)     Difficulty of Paying Living Expenses: Not hard at all   Food Insecurity: Not on file   Transportation Needs: No Transportation Needs (5/29/2024)    PRAPARE - Transportation     Lack of Transportation (Medical): No     Lack of Transportation (Non-Medical): No   Physical Activity: Not on file   Stress: Not on file   Social Connections: Not on file   Intimate Partner Violence: Not on file   Housing Stability: Low Risk  (5/29/2024)    Housing Stability Vital Sign     Unable to Pay for Housing in the Last Year: No     Number of Places Lived in the Last Year: 1     Unstable Housing in the Last Year: No       Tobacco Use: Low Risk  (6/28/2024)    Patient History     Smoking Tobacco Use: Never     Smokeless Tobacco Use: Never     Passive Exposure: Not on file        Social History     Substance and Sexual Activity   Alcohol Use Never        Allergies     Allergies   Allergen Reactions    Azithromycin Unknown, Anaphylaxis, Diarrhea and Other    Clindamycin  Unknown, Anaphylaxis, Other and Itching    Fish Containing Products Anaphylaxis    Nut - Unspecified Anaphylaxis    Penicillins Anaphylaxis and Unknown    Tetracyclines Anaphylaxis, Swelling and Unknown     Severe facial  And tongue swelling    Tree Nuts Anaphylaxis and Unknown    Simvastatin Other and Unknown     Severe muscle pain    Sulfamethoxazole-Trimethoprim Other    Clarithromycin Rash, Unknown, Swelling and Other     rash          Meds    Scheduled medications:  -Amlodipine  -Cetirizine  -Enoxaparin  -Fluticasone  -Metoprolol Succinate XL  -Pantoprazole  -Prednisolone Acetate      PRN medications  Tylenol  Meclizine  Ondansetron  Miralax       Objective    Physical Exam  Constitutional:       General: She is not in acute distress.     Appearance: Normal appearance. She is not ill-appearing.   HENT:      Head: Normocephalic and atraumatic.      Right Ear: External ear normal.      Left Ear: External ear normal.      Nose: Nose normal.      Mouth/Throat:      Mouth: Mucous membranes are moist.   Eyes:      Extraocular Movements: Extraocular movements intact.      Conjunctiva/sclera: Conjunctivae normal.   Cardiovascular:      Rate and Rhythm: Normal rate and regular rhythm.      Pulses: Normal pulses.      Heart sounds: Normal heart sounds.   Pulmonary:      Effort: Pulmonary effort is normal. No respiratory distress.      Breath sounds: Normal breath sounds. No stridor. No wheezing.   Abdominal:      General: Abdomen is flat. There is no distension.      Palpations: Abdomen is soft.      Tenderness: There is no abdominal tenderness.      Hernia: No hernia is present.   Musculoskeletal:         General: No swelling. Normal range of motion.      Cervical back: Normal range of motion and neck supple. No rigidity or tenderness.   Skin:     General: Skin is warm and dry.      Capillary Refill: Capillary refill takes less than 2 seconds.   Neurological:      General: No focal deficit present.      Mental Status:  "She is alert and oriented to person, place, and time. Mental status is at baseline.      GCS: GCS eye subscore is 4. GCS verbal subscore is 5. GCS motor subscore is 6.      Cranial Nerves: Cranial nerves 2-12 are intact. No cranial nerve deficit.      Sensory: No sensory deficit.      Motor: No weakness.      Coordination: Coordination is intact. Coordination normal.      Comments: Mild horizontal nystagmus to the right  Negative Skew Test   Psychiatric:         Mood and Affect: Mood normal.         Behavior: Behavior normal.         Thought Content: Thought content normal.          Visit Vitals  BP (!) 111/92   Pulse 84   Temp 36.4 °C (97.5 °F)   Resp 18   Ht 1.727 m (5' 8\")   Wt 83.9 kg (185 lb)   SpO2 95%   BMI 28.13 kg/m²   OB Status Postmenopausal   Smoking Status Never   BSA 2.01 m²          Intake/Output Summary (Last 24 hours) at 7/6/2024 1816  Last data filed at 7/6/2024 1651  Gross per 24 hour   Intake 1000 ml   Output --   Net 1000 ml             Labs:   Results from last 72 hours   Lab Units 07/06/24  1519   SODIUM mmol/L 138   POTASSIUM mmol/L 3.7   CHLORIDE mmol/L 98   CO2 mmol/L 26   BUN mg/dL 16   CREATININE mg/dL 0.71   GLUCOSE mg/dL 141*   CALCIUM mg/dL 9.5   ANION GAP mmol/L 18   EGFR mL/min/1.73m*2 87      Results from last 72 hours   Lab Units 07/06/24  1519   WBC AUTO x10*3/uL 6.9   HEMOGLOBIN g/dL 14.4   HEMATOCRIT % 43.0   PLATELETS AUTO x10*3/uL 167   NEUTROS PCT AUTO % 79.3   LYMPHS PCT AUTO % 14.1   MONOS PCT AUTO % 4.5   EOS PCT AUTO % 1.0      Lab Results   Component Value Date    CALCIUM 9.5 07/06/2024      No results found for: \"CRP\"        Micro/ID:   No results found for the last 90 days.    No results found for: \"URINECULTURE\", \"BLOODCULT\", \"CSFCULTSMEAR\"                 No lab exists for component: \"AGALPCRNB\"       Images    XR chest 1 view  Narrative: Interpreted By:  Vinnie Barcenas,   STUDY:  XR CHEST 1 VIEW;  7/6/2024 3:53 pm      INDICATION:  Signs/Symptoms:vertigo.    "   COMPARISON:  06/28/2024      ACCESSION NUMBER(S):  [id number]      ORDERING CLINICIAN:  JAMES BOOTH      FINDINGS:                  CARDIOMEDIASTINAL SILHOUETTE:  There is enlargement of the cardiac silhouette with vascular  congestion and mild edema.      LUNGS:  The lungs are hyperinflated. There is increased perihilar and  bibasilar airspace disease. There is no effusion      ABDOMEN:  No remarkable upper abdominal findings.      BONES:  No acute osseous changes.      Impression: 1.  Mild pulmonary edema. Enlarged cardiac silhouette.              MACRO:  None      Signed by: Vinnie Barcenas 7/6/2024 3:58 PM  Dictation workstation:   [id number]  ECG 12 lead  Sinus rhythm with frequent Premature ventricular complexes  Possible Left atrial enlargement  ST & T wave abnormality, consider inferior ischemia  Abnormal ECG  When compared with ECG of 30-MAY-2024 09:08,  Premature ventricular complexes are now Present  T wave inversion now evident in Inferior leads  Nonspecific T wave abnormality now evident in Anterolateral leads  See ED provider note for full interpretation and clinical correlation  Confirmed by Jemima Arzate ([id number]) on 7/6/2024 10:44:52 AM       Encounter Date: 06/28/24   ECG 12 lead   Result Value    Ventricular Rate 88    Atrial Rate 88    UT Interval 180    QRS Duration 78    QT Interval 356    QTC Calculation(Bazett) 430    P Axis 61    R Axis 19    T Axis -8    QRS Count 15    Q Onset 223    P Onset 133    P Offset 185    T Offset 401    QTC Fredericia 404    Narrative    Sinus rhythm with frequent Premature ventricular complexes  Possible Left atrial enlargement  ST & T wave abnormality, consider inferior ischemia  Abnormal ECG  When compared with ECG of 30-MAY-2024 09:08,  Premature ventricular complexes are now Present  T wave inversion now evident in Inferior leads  Nonspecific T wave abnormality now evident in Anterolateral leads  See ED provider note for full interpretation and  clinical correlation  Confirmed by Jemima Arzate ([id number]) on 7/6/2024 10:44:52 AM      Encounter Date: 06/28/24   ECG 12 lead   Result Value    Ventricular Rate 88    Atrial Rate 88    PA Interval 180    QRS Duration 78    QT Interval 356    QTC Calculation(Bazett) 430    P Axis 61    R Axis 19    T Axis -8    QRS Count 15    Q Onset 223    P Onset 133    P Offset 185    T Offset 401    QTC Fredericia 404    Narrative    Sinus rhythm with frequent Premature ventricular complexes  Possible Left atrial enlargement  ST & T wave abnormality, consider inferior ischemia  Abnormal ECG  When compared with ECG of 30-MAY-2024 09:08,  Premature ventricular complexes are now Present  T wave inversion now evident in Inferior leads  Nonspecific T wave abnormality now evident in Anterolateral leads  See ED provider note for full interpretation and clinical correlation  Confirmed by Jemima Arzate ([id number]) on 7/6/2024 10:44:52 AM          Assessment and Plan    Le Granados is a 78 y.o. female admitted on 7/6/2024 with pmh significant for HTN and Breast Cancer s/p radiation and lumpectomy presenting for dizziness.    ACUTE MEDICAL ISSUES:    #Dizziness  #BPPV vs Vestibular Neuritis  -Patient suddenly became extremely dizzy at 10:30 am on the day of admission  -She had similar symptoms on 5/29, CT head, Echo, and MRI were benign  Plan:  -Meclizine PRN  -Repeat CT head  -Initiate prednisone 50mg x5 days then taper   -Continue Zofran for Nausea  -Consult Neurology      CHRONIC MEDICAL ISSUES:  #HTN- Continue home amlodipine and metoprolol succinate  #Esophageal Dysplasia-Pantoprazole 40 mg    Fluids: None indicated  Electrolytes: Replete PRN  Nutrition: Adult Regular Diet  Antimicrobials: None Indicated, Multiple Antibiotic allergies  DVT ppx: Lovenox  GI ppx: Pantoprazole  Catheter: None  Lines: 20G PIV L  Supplemental Oxygen: None indicated  Emergency Contact: Extended Emergency Contact Information  Primary Emergency  Contact: Rajan Arroyo  Home Phone: [phone]  Work Phone: [phone]  Relation: Spouse  Secondary Emergency Contact: JUAN DANIEL ARROYO  Mobile Phone: [phone]  Relation: Daughter  Preferred language: English   needed? No   Code: Full Code     Disposition: ELOS>2 days pending Neurology Consult    Wolfgang Beckwith,   Internal Medicine, PGY- 1  07/06/24 at 6:16 PM         PGY-3 Attestation: Patient presents with acute onset dizziness and vertigo like symptoms as well as right eye nystagmus, mostly horizontal. Admitted in May of this year for similar presentation and concern for posterior CVA which was ruled out. Presentation most consistent with acute vestibular neuritis vs BPPV. Will trial Prednisone 50mg daily in addition to Meclizine PRN. Neurology consulted. Agree with remainder of plan as documented above.

## 2024-07-07 VITALS
TEMPERATURE: 97.7 F | RESPIRATION RATE: 18 BRPM | SYSTOLIC BLOOD PRESSURE: 130 MMHG | WEIGHT: 189 LBS | HEART RATE: 82 BPM | HEIGHT: 63 IN | BODY MASS INDEX: 33.49 KG/M2 | DIASTOLIC BLOOD PRESSURE: 66 MMHG | OXYGEN SATURATION: 95 %

## 2024-07-07 LAB
ALBUMIN SERPL BCP-MCNC: 3.8 G/DL (ref 3.4–5)
ANION GAP SERPL CALC-SCNC: 13 MMOL/L (ref 10–20)
BUN SERPL-MCNC: 14 MG/DL (ref 6–23)
CALCIUM SERPL-MCNC: 9.2 MG/DL (ref 8.6–10.3)
CHLORIDE SERPL-SCNC: 104 MMOL/L (ref 98–107)
CO2 SERPL-SCNC: 27 MMOL/L (ref 21–32)
CREAT SERPL-MCNC: 0.65 MG/DL (ref 0.5–1.05)
EGFRCR SERPLBLD CKD-EPI 2021: 90 ML/MIN/1.73M*2
ERYTHROCYTE [DISTWIDTH] IN BLOOD BY AUTOMATED COUNT: 12.2 % (ref 11.5–14.5)
GLUCOSE SERPL-MCNC: 126 MG/DL (ref 74–99)
HCT VFR BLD AUTO: 42 % (ref 36–46)
HGB BLD-MCNC: 13.9 G/DL (ref 12–16)
HOLD SPECIMEN: NORMAL
MAGNESIUM SERPL-MCNC: 1.82 MG/DL (ref 1.6–2.4)
MCH RBC QN AUTO: 28.2 PG (ref 26–34)
MCHC RBC AUTO-ENTMCNC: 33.1 G/DL (ref 32–36)
MCV RBC AUTO: 85 FL (ref 80–100)
NRBC BLD-RTO: 0 /100 WBCS (ref 0–0)
PHOSPHATE SERPL-MCNC: 3.1 MG/DL (ref 2.5–4.9)
PLATELET # BLD AUTO: 158 X10*3/UL (ref 150–450)
POTASSIUM SERPL-SCNC: 3.9 MMOL/L (ref 3.5–5.3)
RBC # BLD AUTO: 4.93 X10*6/UL (ref 4–5.2)
SODIUM SERPL-SCNC: 140 MMOL/L (ref 136–145)
WBC # BLD AUTO: 5.9 X10*3/UL (ref 4.4–11.3)

## 2024-07-07 PROCEDURE — 36415 COLL VENOUS BLD VENIPUNCTURE: CPT

## 2024-07-07 PROCEDURE — 2500000002 HC RX 250 W HCPCS SELF ADMINISTERED DRUGS (ALT 637 FOR MEDICARE OP, ALT 636 FOR OP/ED)

## 2024-07-07 PROCEDURE — 99239 HOSP IP/OBS DSCHRG MGMT >30: CPT

## 2024-07-07 PROCEDURE — 94760 N-INVAS EAR/PLS OXIMETRY 1: CPT

## 2024-07-07 PROCEDURE — 2500000004 HC RX 250 GENERAL PHARMACY W/ HCPCS (ALT 636 FOR OP/ED)

## 2024-07-07 PROCEDURE — 83735 ASSAY OF MAGNESIUM: CPT

## 2024-07-07 PROCEDURE — 80069 RENAL FUNCTION PANEL: CPT

## 2024-07-07 PROCEDURE — 99222 1ST HOSP IP/OBS MODERATE 55: CPT | Performed by: PSYCHIATRY & NEUROLOGY

## 2024-07-07 PROCEDURE — 85027 COMPLETE CBC AUTOMATED: CPT

## 2024-07-07 PROCEDURE — 2500000001 HC RX 250 WO HCPCS SELF ADMINISTERED DRUGS (ALT 637 FOR MEDICARE OP)

## 2024-07-07 PROCEDURE — G0378 HOSPITAL OBSERVATION PER HR: HCPCS

## 2024-07-07 RX ORDER — PREDNISONE 10 MG/1
TABLET ORAL
Qty: 13 TABLET | Refills: 0 | Status: SHIPPED | OUTPATIENT
Start: 2024-07-07 | End: 2024-07-07

## 2024-07-07 RX ORDER — POTASSIUM CHLORIDE 20 MEQ/1
20 TABLET, EXTENDED RELEASE ORAL ONCE
Status: COMPLETED | OUTPATIENT
Start: 2024-07-07 | End: 2024-07-07

## 2024-07-07 RX ORDER — PREDNISONE 10 MG/1
TABLET ORAL
Qty: 13 TABLET | Refills: 0 | Status: SHIPPED | OUTPATIENT
Start: 2024-07-07

## 2024-07-07 RX ORDER — LANOLIN ALCOHOL/MO/W.PET/CERES
400 CREAM (GRAM) TOPICAL ONCE
Status: COMPLETED | OUTPATIENT
Start: 2024-07-07 | End: 2024-07-07

## 2024-07-07 RX ADMIN — METOPROLOL SUCCINATE 50 MG: 50 TABLET, EXTENDED RELEASE ORAL at 09:11

## 2024-07-07 RX ADMIN — PANTOPRAZOLE SODIUM 40 MG: 40 TABLET, DELAYED RELEASE ORAL at 11:22

## 2024-07-07 RX ADMIN — Medication 400 MG: at 11:22

## 2024-07-07 RX ADMIN — POTASSIUM CHLORIDE 20 MEQ: 1500 TABLET, EXTENDED RELEASE ORAL at 11:22

## 2024-07-07 RX ADMIN — PREDNISONE 50 MG: 20 TABLET ORAL at 11:23

## 2024-07-07 RX ADMIN — AMLODIPINE BESYLATE 5 MG: 5 TABLET ORAL at 09:11

## 2024-07-07 RX ADMIN — CETIRIZINE HYDROCHLORIDE 10 MG: 10 TABLET, FILM COATED ORAL at 11:22

## 2024-07-07 ASSESSMENT — ENCOUNTER SYMPTOMS
NERVOUS/ANXIOUS: 0
SHORTNESS OF BREATH: 0
COUGH: 0
POLYDIPSIA: 0
HALLUCINATIONS: 0
SINUS PRESSURE: 0
TROUBLE SWALLOWING: 0
ADENOPATHY: 0
DYSURIA: 0
WOUND: 0
STRIDOR: 0
APNEA: 0
MYALGIAS: 0
CONFUSION: 0
EYE ITCHING: 0
NUMBNESS: 0
APPETITE CHANGE: 0
NECK PAIN: 0
FEVER: 0
SLEEP DISTURBANCE: 0
LIGHT-HEADEDNESS: 0
CONSTIPATION: 0
ABDOMINAL PAIN: 0
SORE THROAT: 0
SEIZURES: 0
JOINT SWELLING: 0
CHEST TIGHTNESS: 0
WHEEZING: 0
NAUSEA: 0
VOMITING: 0
NECK STIFFNESS: 0
COLOR CHANGE: 0
VOICE CHANGE: 0
FATIGUE: 0
PALPITATIONS: 0
SINUS PAIN: 0
FREQUENCY: 0
EYE DISCHARGE: 0
HYPERACTIVE: 0
RECTAL PAIN: 0
EYE PAIN: 0
AGITATION: 0
BRUISES/BLEEDS EASILY: 0
CHILLS: 0
DYSPHORIC MOOD: 0
DIZZINESS: 0
BLOOD IN STOOL: 0
FLANK PAIN: 0
ABDOMINAL DISTENTION: 0
CHOKING: 0
DIFFICULTY URINATING: 0
DECREASED CONCENTRATION: 0
DIAPHORESIS: 0
HEADACHES: 0
BACK PAIN: 0
ANAL BLEEDING: 0
EYE REDNESS: 0
ARTHRALGIAS: 0
ACTIVITY CHANGE: 0
SPEECH DIFFICULTY: 0
FACIAL ASYMMETRY: 0
DIARRHEA: 0

## 2024-07-07 ASSESSMENT — PAIN - FUNCTIONAL ASSESSMENT: PAIN_FUNCTIONAL_ASSESSMENT: 0-10

## 2024-07-07 ASSESSMENT — COGNITIVE AND FUNCTIONAL STATUS - GENERAL
DAILY ACTIVITIY SCORE: 24
MOBILITY SCORE: 24

## 2024-07-07 ASSESSMENT — ACTIVITIES OF DAILY LIVING (ADL): LACK_OF_TRANSPORTATION: NO

## 2024-07-07 ASSESSMENT — PAIN SCALES - GENERAL: PAINLEVEL_OUTOF10: 0 - NO PAIN

## 2024-07-07 NOTE — DISCHARGE INSTRUCTIONS
Please, take your home medications as instructed after being discharged from the hospital.     NEW MEDICATIONS:  Please start taking prednisone 3 tablets (30mg) for 2 days, then 2 tablets (20) mg for 2 days, then 1 tablet (10 mg) for 2 days, and finally 1/2 tablet (5mg) for 2 days    OTHER INSTRUCTIONS:  Please follow up with otolaryngologist (ENT), please ask him about vestibulo nystagmogram  Start taking BP log at home for 10 days, please write it down and take it to your PCP at the next appoinment  At discharge you will be given a tapering dose of steroids, please take as recommended.  Please ask your cardiologist about zio patch for recurring dizziness    UPCOMING APPOINTMENTS:     Please, follow-up with your PCP within 7 to 14 days and ENT in a month after leaving the hospital. /appointment services has been requested to make an appointment for you, however if you do not hear back from them within 1 to 2 days, please call your primary physician's office to schedule an appointment. Bring your photo ID and insurance card to your appointment.   [facility]  services can be reached at [phone].     If you experience any worsening symptoms or have any concerns, please contact your primary care provider to schedule an appointment. If you cannot get in touch with your primary care physician, please return to the nearest emergency room or urgent care clinic for an evaluation and treatment.     Thank you for choosing [facility] and allowing us to partake in your medical care!     - Your [facility] inpatient primary care team.

## 2024-07-07 NOTE — CONSULTS
Inpatient consult to Neurology  Consult performed by: Jovani Soriano MD  Consult ordered by: DO BEVERLY Cheng C : Sudden onset dizziness    History Of Present Illness  Le Granados is a 78 y.o. female presenting with mild dizziness that started out fo the blue and it was around 1 to 2 hours after taking her BP pills. She usually tales pills around 9 AM and it happened around 10;30 AM. It not associated with focal weakness or numbness in face arm or leg or diplopia or blurring of vision or loss of balance.  She remembers that it happened 1 time before and was long time ago.  Usually the patient does not check her blood pressure at home.    She was seen and resident thought about possible neuritis at admission was given steroids taper off course.      Past Medical and Surgical History    Past Medical History:   Diagnosis Date    Breast cancer (Multi) 2021    lt side    COVID-19 12/17/2020    COVID-19 virus infection    Encounter for other preprocedural examination 03/05/2021    Preop examination    Other conditions influencing health status 12/02/2020    History of cough    Personal history of malignant neoplasm of breast     History of malignant neoplasm of breast    Personal history of other diseases of the circulatory system     History of hypertension    Personal history of other diseases of the musculoskeletal system and connective tissue 09/21/2017    History of bone disorder    Personal history of other diseases of the nervous system and sense organs 03/05/2021    History of otitis media    Personal history of other diseases of the respiratory system     History of pneumothorax    Personal history of other specified conditions 02/23/2018    History of abdominal pain    Personal history of other specified conditions     History of chest pain          Past Surgical History:   Procedure Laterality Date    BREAST BIOPSY Right 2008    bgn    BREAST LUMPECTOMY  09/25/2017    Breast Surgery Lumpectomy     CARPAL TUNNEL RELEASE  09/25/2017    Neuroplasty Decompression Median Nerve At Carpal Tunnel    CATARACT EXTRACTION  09/25/2017    Cataract Extraction    CERVICAL FUSION  09/25/2017    Cervical Vertebral Fusion    HAND TENDON SURGERY  09/25/2017    Hand Incision Tendon Sheath Of A Finger    HYSTERECTOMY  09/25/2017    Hysterectomy    KNEE ARTHROSCOPY W/ DEBRIDEMENT  09/25/2017    Arthroscopy Knee Right    LUMBAR LAMINECTOMY  09/25/2017    Laminectomy Lumbar    OTHER SURGICAL HISTORY  09/25/2017    Oophorectomy - Bilateral (Removal Of Both Ovaries)    OTHER SURGICAL HISTORY  05/13/2021    Knee replacement    TYMPANOSTOMY TUBE PLACEMENT  09/25/2017    Ear Pressure Equalization Tube        Social History  Social History     Tobacco Use    Smoking status: Never    Smokeless tobacco: Never   Substance Use Topics    Alcohol use: Never    Drug use: Never     Allergies  Azithromycin, Clindamycin, Fish containing products, Nut - unspecified, Penicillins, Tetracyclines, Tree nuts, Simvastatin, Sulfamethoxazole-trimethoprim, and Clarithromycin  Medications Prior to Admission   Medication Sig Dispense Refill Last Dose    amLODIPine (Norvasc) 5 mg tablet Take 1 tablet (5 mg) by mouth once daily. 90 tablet 1 7/6/2024 at 0900    cetirizine HCl (ZYRTEC ORAL) ZyrTEC Allergy   7/5/2024 at 0900    EPINEPHrine 0.3 mg/0.3 mL injection syringe INJECT AS DIRECTED FOR ANAPHYLAXIS (AND CALL 911 IF OCCURS) 2 each 1 Past Month    fluticasone (Flonase) 50 mcg/actuation nasal spray Administer 1 spray into each nostril once daily. Shake gently. Before first use, prime pump. After use, clean tip and replace cap. 16 g 11 7/5/2024 at 2200    metoprolol succinate XL (Toprol-XL) 50 mg 24 hr tablet TAKE 1 TABLET BY MOUTH EVERY DAY 90 tablet 1 7/6/2024 at 0900    multivitamin tablet Take 1 tablet by mouth once daily at bedtime.   7/6/2024 at 0900    omeprazole (PriLOSEC) 40 mg DR capsule TAKE 1 CAPSULE BY MOUTH EVERY DAY 90 capsule 1 7/6/2024 at 0900  "   prednisoLONE acetate (Pred-Forte) 1 % ophthalmic suspension Administer 1 drop into affected eye(s) once daily.   7/5/2024 at 2200    meclizine (Antivert) 25 mg tablet Take 1 tablet (25 mg) by mouth 3 times a day as needed for dizziness. (Patient not taking: Reported on 7/6/2024) 30 tablet 0 More than a month       Review of Systems      Cardiovascular system: S1-S2 intact  Respiratory clear to auscultation bilateral on deep breathing he feels irritability on the left side of the chest.    Neurological Exam      Last Recorded Vitals  Blood pressure 130/66, pulse 82, temperature 36.5 °C (97.7 °F), temperature source Temporal, resp. rate 18, height 1.6 m (5' 3\"), weight 85.7 kg (189 lb), SpO2 95%.    Relevant Results      Current Facility-Administered Medications:     acetaminophen (Tylenol) tablet 650 mg, 650 mg, oral, q6h PRN, Luba Cole DO    amLODIPine (Norvasc) tablet 5 mg, 5 mg, oral, Daily, Luba Cole DO, 5 mg at 07/07/24 0911    cetirizine (ZyrTEC) tablet 10 mg, 10 mg, oral, Daily, Luba Cole DO    enoxaparin (Lovenox) syringe 40 mg, 40 mg, subcutaneous, q24h, Luba Cole DO    fluticasone (Flonase) nasal spray 1 spray, 1 spray, Each Nostril, Daily, Luba Cole DO, 1 spray at 07/06/24 2025    magnesium oxide (Mag-Ox) tablet 400 mg, 400 mg, oral, Once, Wolfgang Beckwith DO    meclizine (Antivert) tablet 25 mg, 25 mg, oral, TID PRN, Luba Cole DO    metoprolol succinate XL (Toprol-XL) 24 hr tablet 50 mg, 50 mg, oral, Daily, Luba Cole DO, 50 mg at 07/07/24 0911    ondansetron (Zofran) injection 4 mg, 4 mg, intravenous, q6h PRN, Luba Cole DO    pantoprazole (ProtoNix) EC tablet 40 mg, 40 mg, oral, Daily before breakfast, Luba Cole DO    polyethylene glycol (Glycolax, Miralax) packet 17 g, 17 g, oral, Daily PRN, Luba Cole, DO    potassium chloride CR (Klor-Con M20) ER tablet 20 mEq, 20 mEq, oral, Once, Wolfgang Beckwith,     prednisoLONE acetate (Pred-Forte) 1 % ophthalmic suspension 1 " drop, 1 drop, ophthalmic (eye), Nightly, Luba MartinDouglas , 1 drop at 07/06/24 2128    predniSONE (Deltasone) tablet 50 mg, 50 mg, oral, Daily, Luba Cole , 50 mg at 07/06/24 2025     Continuous medications    PRN medications, reviewed    NIH Stroke Scale  1A. Level of Consciousness: Alert, Keenly Responsive  1B. Ask Month and Age: Both Questions Right  1C. Blink Eyes & Squeeze Hands: Performs Both Tasks  2. Best Gaze: Normal  3. Visual: No Visual Loss  4. Facial Palsy: Normal Symmetrical Movements  5A. Motor - Left Arm: No Drift  5B. Motor - Right Arm: No Drift  6A. Motor - Left Leg: No Drift  6B. Motor - Right Leg: No Drift  7. Limb Ataxia: Absent  8. Sensory Loss: Normal  9. Best Language: No Aphasia  10. Dysarthria: Normal  11. Extinction and Inattention: No Abnormality  NIH Stroke Scale: 0           [place] Coma Scale  Best Eye Response: Spontaneous  Best Verbal Response: Oriented  Best Motor Response: Follows commands  Vahid Coma Scale Score: 15               Neurologic Exam  Patient is awake alert oriented x 3.  Cranial nerves II through XII intact  Deep tendon reflexes 2+ biceps triceps brachioradialis knees and ankles  Strength 5/5 proximal and distal bilateral upper and lower extremities  Tone normal bilateral upper lower extremities  Gait normal  Finger-nose-finger heel-to-shin test normal  Sensations for pinprick temperature vibration intact in all distal upper lower extremities.    CVS S1-S2  Respiratory clear to auscultation bilateral     ======================  I have personally reviewed the following imaging for brain (CT/ MR) and results and discussed in detail with the patient/care giver/family , dated   ECG 12 lead        Component Value Flag Ref Range Units Status    Ventricular Rate 80       BPM Preliminary    Atrial Rate 80       BPM Preliminary    MS Interval 226       ms Preliminary    QRS Duration 76       ms Preliminary    QT Interval 354       ms Preliminary    QTC Calculation(Bazett)  408       ms Preliminary    P Raven[place] 53       degrees Preliminary    R Axis 7       degrees Preliminary    T Axis 2       degrees Preliminary    QRS Count 13       beats Preliminary    Q Onset 228       ms Preliminary    P Onset 115       ms Preliminary    P Offset 179       ms Preliminary    T Offset 405       ms Preliminary    QTC Fredericia 389       ms Preliminary                 Sinus rhythm with 1st degree AV block  Possible Left atrial enlargement  Septal infarct , age undetermined  Abnormal ECG  When compared with ECG of 28-JUN-2024 19:31,  Premature ventricular complexes are no longer Present  WA interval has increased         CBC and Auto Differential        Component Value Flag Ref Range Units Status    WBC 6.9      4.4 - 11.3 x10*3/uL Final    nRBC 0.0      0.0 - 0.0 /100 WBCs Final    RBC 5.03      4.00 - 5.20 x10*6/uL Final    Hemoglobin 14.4      12.0 - 16.0 g/dL Final    Hematocrit 43.0      36.0 - 46.0 % Final    MCV 86      80 - 100 fL Final    MCH 28.6      26.0 - 34.0 pg Final    MCHC 33.5      32.0 - 36.0 g/dL Final    RDW 12.4      11.5 - 14.5 % Final    Platelets 167      150 - 450 x10*3/uL Final    Neutrophils % 79.3      40.0 - 80.0 % Final    Immature Granulocytes %, Automated 0.4      0.0 - 0.9 % Final    Comment:    Immature Granulocyte Count (IG) includes promyelocytes, myelocytes and metamyelocytes but does not include bands. Percent differential counts (%) should be interpreted in the context of the absolute cell counts (cells/UL).    Lymphocytes % 14.1      13.0 - 44.0 % Final    Monocytes % 4.5      2.0 - 10.0 % Final    Eosinophils % 1.0      0.0 - 6.0 % Final    Basophils % 0.7      0.0 - 2.0 % Final    Neutrophils Absolute 5.45      1.60 - 5.50 x10*3/uL Final    Comment:    Percent differential counts (%) should be interpreted in the context of the absolute cell counts (cells/uL).    Immature Granulocytes Absolute, Automated 0.03      0.00 - 0.50 x10*3/uL Final    Lymphocytes  Absolute 0.97      0.80 - 3.00 x10*3/uL Final    Monocytes Absolute 0.31      0.05 - 0.80 x10*3/uL Final    Eosinophils Absolute 0.07      0.00 - 0.40 x10*3/uL Final    Basophils Absolute 0.05      0.00 - 0.10 x10*3/uL Final                  Magnesium        Component Value Flag Ref Range Units Status    Magnesium 1.85      1.60 - 2.40 mg/dL Final                  Comprehensive metabolic panel        Component Value Flag Ref Range Units Status    Glucose 141      74 - 99 mg/dL Final    Sodium 138      136 - 145 mmol/L Final    Potassium 3.7      3.5 - 5.3 mmol/L Final    Chloride 98      98 - 107 mmol/L Final    Bicarbonate 26      21 - 32 mmol/L Final    Anion Gap 18      10 - 20 mmol/L Final    Urea Nitrogen 16      6 - 23 mg/dL Final    Creatinine 0.71      0.50 - 1.05 mg/dL Final    eGFR 87      >60 mL/min/1.73m*2 Final    Comment:    Calculations of estimated GFR are performed using the 2021 CKD-EPI Study Refit equation without the race variable for the IDMS-Traceable creatinine methods.  https://jasn.asnjournals.org/content/early/2021/09/22/ASN.7815189263    Calcium 9.5      8.6 - 10.3 mg/dL Final    Albumin 4.3      3.4 - 5.0 g/dL Final    Alkaline Phosphatase 65      33 - 136 U/L Final    Total Protein 6.8      6.4 - 8.2 g/dL Final    AST 16      9 - 39 U/L Final    Bilirubin, Total 0.6      0.0 - 1.2 mg/dL Final    ALT 10      7 - 45 U/L Final    Comment:    Patients treated with Sulfasalazine may generate falsely decreased results for ALT.                  Protime-INR        Component Value Flag Ref Range Units Status    Protime 11.8      9.8 - 12.8 seconds Final    INR 1.0      0.9 - 1.1  Final                  B-Type Natriuretic Peptide        Component Value Flag Ref Range Units Status    BNP 48      0 - 99 pg/mL Final                  XR chest 1 view          Interpreted By:  Vinnie Barcenas,   STUDY:  XR CHEST 1 VIEW;  7/6/2024 3:53 pm      INDICATION:  Signs/Symptoms:vertigo.       COMPARISON:  06/28/2024      ACCESSION NUMBER(S):  [id number]      ORDERING CLINICIAN:  JAMES BOOTH      FINDINGS:                  CARDIOMEDIASTINAL SILHOUETTE:  There is enlargement of the cardiac silhouette with vascular  congestion and mild edema.      LUNGS:  The lungs are hyperinflated. There is increased perihilar and  bibasilar airspace disease. There is no effusion      ABDOMEN:  No remarkable upper abdominal findings.      BONES:  No acute osseous changes.      IMPRESSION:  1.  Mild pulmonary edema. Enlarged cardiac silhouette.              MACRO:  None      Signed by: Vinnie Barcenas 7/6/2024 3:58 PM  Dictation workstation:   [id number]         Urinalysis with Reflex Culture and Microscopic        Component Value Flag Ref Range Units Status    Color, Urine Light-Yellow      Light-Yellow, Yellow, Dark-Yellow  Final    Appearance, Urine Clear      Clear  Final    Specific Gravity, Urine 1.015      1.005 - 1.035  Final    pH, Urine 6.5      5.0, 5.5, 6.0, 6.5, 7.0, 7.5, 8.0  Final    Protein, Urine NEGATIVE      NEGATIVE, 10 (TRACE), 20 (TRACE) mg/dL Final    Glucose, Urine Normal      Normal mg/dL Final    Blood, Urine NEGATIVE      NEGATIVE  Final    Ketones, Urine 10 (1+)      NEGATIVE mg/dL Final    Bilirubin, Urine NEGATIVE      NEGATIVE  Final    Urobilinogen, Urine Normal      Normal mg/dL Final    Nitrite, Urine NEGATIVE      NEGATIVE  Final    Leukocyte Esterase, Urine NEGATIVE      NEGATIVE  Final                  Extra Urine Gray Tube        Component    Extra Tube    Hold for add-ons.      Comment:    Auto resulted.                  Troponin I, High Sensitivity, Initial        Component Value Flag Ref Range Units Status    Troponin I, High Sensitivity 3      0 - 13 ng/L Final                  Troponin, High Sensitivity, 1 Hour        Component Value Flag Ref Range Units Status    Troponin I, High Sensitivity 3      0 - 13 ng/L Final                  CT head wo IV contrast           Interpreted By:  Imer Ambrocio,   STUDY:  CT HEAD WO IV CONTRAST;  7/6/2024 7:06 pm      INDICATION:  Signs/Symptoms:vertigo.      COMPARISON:  CT brain, MRI brain, MRA head and neck 05/29/2024      ACCESSION NUMBER(S):  [id number]      ORDERING CLINICIAN:  GIORGI WING      TECHNIQUE:  Noncontrast axial CT scan of head was performed.      FINDINGS:  Parenchyma: There is no intracranial hemorrhage. The grey-white  differentiation is intact. There is no mass effect or midline shift.  Patchy supratentorial hypodensity, nonspecific, but likely secondary  to chronic microvascular ischemia.      CSF Spaces: The ventricles, sulci and basal cisterns are within  normal limits for age.      Extra-Axial Fluid: There is no extraaxial fluid collection.      Calvarium: The calvarium is unremarkable.      Paranasal sinuses: Visualized paranasal sinuses are clear.      Mastoids: Clear.      Orbits: Status post left scleral banding. Right lens replacement.      Soft tissues: Unremarkable.      IMPRESSION:  No acute intracranial hemorrhage, mass effect, or CT apparent acute  infarct. Mild chronic microvascular ischemic changes.      Signed by: Imer Ambrocio 7/6/2024 7:59 PM  Dictation workstation:   [id number]         CBC        Component Value Flag Ref Range Units Status    WBC 5.9      4.4 - 11.3 x10*3/uL Final    nRBC 0.0      0.0 - 0.0 /100 WBCs Final    RBC 4.93      4.00 - 5.20 x10*6/uL Final    Hemoglobin 13.9      12.0 - 16.0 g/dL Final    Hematocrit 42.0      36.0 - 46.0 % Final    MCV 85      80 - 100 fL Final    MCH 28.2      26.0 - 34.0 pg Final    MCHC 33.1      32.0 - 36.0 g/dL Final    RDW 12.2      11.5 - 14.5 % Final    Platelets 158      150 - 450 x10*3/uL Final                  Renal function panel        Component Value Flag Ref Range Units Status    Glucose 126      74 - 99 mg/dL Final    Sodium 140      136 - 145 mmol/L Final    Potassium 3.9      3.5 - 5.3 mmol/L Final    Chloride 104      98 -  107 mmol/L Final    Bicarbonate 27      21 - 32 mmol/L Final    Anion Gap 13      10 - 20 mmol/L Final    Urea Nitrogen 14      6 - 23 mg/dL Final    Creatinine 0.65      0.50 - 1.05 mg/dL Final    eGFR 90      >60 mL/min/1.73m*2 Final    Comment:    Calculations of estimated GFR are performed using the 2021 CKD-EPI Study Refit equation without the race variable for the IDMS-Traceable creatinine methods.  https://jasn.asnjournals.org/content/early/2021/09/22/ASN.9408743834    Calcium 9.2      8.6 - 10.3 mg/dL Final    Phosphorus 3.1      2.5 - 4.9 mg/dL Final    Comment:    The performance characteristics of phosphorus testing in heparinized plasma have been validated by the individual  laboratory site where testing is performed. Testing on heparinized plasma is not approved by the FDA; however, such approval is not necessary.    Albumin 3.8      3.4 - 5.0 g/dL Final                  Magnesium        Component Value Flag Ref Range Units Status    Magnesium 1.82      1.60 - 2.40 mg/dL Final                      Assessment/Plan   Dizziness of unclear etiology and most likely t was related to dehydration or BP medication side effects    Patient/Family Education: Extensive time was spent educating the patient on relevant anatomy, clinical findings and imaging, as well as discussing the potential diagnoses as discussed above.  Pharmacology: as above. Exercise: I discussed the importance of maintaining a daily exercise program, including stretching and strengthening. Preventative strategies were reviewed, specifically avoidance of any exercises that exacerbate pain.Return to online virtual visit/ clinic visit for follow-up with PCP in 2 weeks or sooner as needed.The patient expressed understanding and agreement with the assessment and plan.  Patient encouraged to contact us should they have any questions, concerns, or any changes in symptoms. Thank you for allowing me to participate in the care of your patient.** This  note is created using speech recognition transcription software. Despite proofreading, several typographical errors might be present that might affect the meaning of the content. Please call with any questions.**          Jovani Soriano MD

## 2024-07-07 NOTE — CARE PLAN
Problem: Fall/Injury  Goal: Not fall by end of shift  Outcome: Met  Goal: Be free from injury by end of the shift  Outcome: Met  Goal: Verbalize understanding of personal risk factors for fall in the hospital  Outcome: Met  Goal: Verbalize understanding of risk factor reduction measures to prevent injury from fall in the home  Outcome: Met  Goal: Use assistive devices by end of the shift  Outcome: Met  Goal: Pace activities to prevent fatigue by end of the shift  Outcome: Met     Problem: Pain - Adult  Goal: Verbalizes/displays adequate comfort level or baseline comfort level  Outcome: Met     Problem: Safety - Adult  Goal: Free from fall injury  Outcome: Met     Problem: Discharge Planning  Goal: Discharge to home or other facility with appropriate resources  Outcome: Met     Problem: Chronic Conditions and Co-morbidities  Goal: Patient's chronic conditions and co-morbidity symptoms are monitored and maintained or improved  Outcome: Met     Problem: General Stroke  Goal: Establish a mutual long term goal with patient by discharge  Outcome: Met  Goal: Demonstrate improvement in neurological exam throughout the shift  Outcome: Met  Goal: Maintain BP within ordered limits throughout shift  Outcome: Met  Goal: Participate in treatment (ie., meds, therapy) throughout shift  Outcome: Met  Goal: No symptoms of aspiration throughout shift  Outcome: Met  Goal: No symptoms of hemorrhage throughout shift  Outcome: Met  Goal: Tolerate enteral feeding throughout shift  Outcome: Met  Goal: Decreased nausea/vomiting throughout shift  Outcome: Met  Goal: Controlled blood glucose throughout shift  Outcome: Met  Goal: Out of bed three times today  Outcome: Met   The patient's goals for the shift include  to discharge home.    The clinical goals for the shift include pt to continue to be free of dizziness.    Over the shift, the patient did not make progress toward the following goals. Barriers to progression include none.  Recommendations to address these barriers include none.

## 2024-07-07 NOTE — DISCHARGE SUMMARY
Discharge Diagnosis  Dizziness    Issues Requiring Follow-Up  Dizziness  Nystagmus    Discharge Meds     Your medication list        START taking these medications        Instructions Last Dose Given Next Dose Due   predniSONE 10 mg tablet  Commonly known as: Deltasone      Please take 3 tabs (30 mg) PO x 2 days, followed by 2 tabs (20 mg) x2 days. 1 tab (10 mg) x2 days, then 1/2 tab (5 mg) x 2 days, then stop              CONTINUE taking these medications        Instructions Last Dose Given Next Dose Due   amLODIPine 5 mg tablet  Commonly known as: Norvasc      Take 1 tablet (5 mg) by mouth once daily.       EPINEPHrine 0.3 mg/0.3 mL injection syringe  Commonly known as: Epipen      INJECT AS DIRECTED FOR ANAPHYLAXIS (AND CALL 911 IF OCCURS)       fluticasone 50 mcg/actuation nasal spray  Commonly known as: Flonase      Administer 1 spray into each nostril once daily. Shake gently. Before first use, prime pump. After use, clean tip and replace cap.       metoprolol succinate XL 50 mg 24 hr tablet  Commonly known as: Toprol-XL      TAKE 1 TABLET BY MOUTH EVERY DAY       multivitamin tablet           omeprazole 40 mg DR capsule  Commonly known as: PriLOSEC      TAKE 1 CAPSULE BY MOUTH EVERY DAY       prednisoLONE acetate 1 % ophthalmic suspension  Commonly known as: Pred-Forte           ZYRTEC ORAL                  ASK your doctor about these medications        Instructions Last Dose Given Next Dose Due   meclizine 25 mg tablet  Commonly known as: Antivert      Take 1 tablet (25 mg) by mouth 3 times a day as needed for dizziness.                 Where to Get Your Medications        You can get these medications from any pharmacy    Bring a paper prescription for each of these medications  predniSONE 10 mg tablet         Test Results Pending At Discharge  Pending Labs       No current pending labs.          Brief HPI  Le Granados is a 78 y.o. year old female  patient with pmh significant for HTN, esophageal  dysplasia, and Breast Cancer s/p radiation and lumpectomy presenting for dizziness. Patient was recently admitted for similar symptoms on 5/29. Echo, CT head, and MRI had no concerning findings at the time.     Hospital Course  The patient presented to the Ed with complaints of dizziness that occurred suddenly at 10:30 am the day of admission. Her vitals were stable on presentation. Labs were unremarkable. Troponins and BNP were negative. CXR in the ED showed mild pulmonary edema and an enlarged cardiac silhouette. Le was given meclizine, zofran for nausea, and 1L LR bolus, which reportedly improved her symptoms. A CT Head was ordered, Neurology was consulted, and the patient was transferred to an observation unit.    When the patient arrived at the observation unit, she received 50mg prednisone for suspected vestibular neuritis and her home medication was continued. The CT head shoed no acute intracranial hemorrhage, mass effect, or acute infarct. There were some mild chronic miscrovascular ischemic changes. Neurology was consulted and orally gave clearance for discharge with prednisone taper, follow-up with ENT, and BP checks for 10 days to bring to her PCP within 2 weeks. Patient reported alleviation of her symptoms and her labs continued to be unremarkable. She was medically stable and discharged to home on 07/07/2024 with prednisone taper    Discharge  Patient was discharged with the following instructions:  -Please start taking prednisone 3 tablets (30mg) for 2 days, then 2 tablets (20) mg for 2 days, then 1 tablet (10 mg) for 2 days, and finally 1/2 tablet (5mg) for 2 days  -Measure BP at home and write it down for 10 days, then bring it to your PCP at the next appointment  -Please follow up with otolaryngologist (ENT), please ask him about vestibulo nystagmogram  -Please ask your cardiologist about zio patch for recurring dizziness      Pertinent Physical Exam At Time of Discharge  Physical  Exam  Constitutional:       General: She is not in acute distress.     Appearance: She is not ill-appearing.   HENT:      Head: Normocephalic and atraumatic.      Right Ear: External ear normal.      Left Ear: External ear normal.      Nose: Nose normal.   Eyes:      General:         Right eye: No discharge.         Left eye: No discharge.      Extraocular Movements: Extraocular movements intact.      Conjunctiva/sclera: Conjunctivae normal.   Cardiovascular:      Rate and Rhythm: Normal rate and regular rhythm.      Pulses: Normal pulses.      Heart sounds: Normal heart sounds.   Pulmonary:      Effort: Pulmonary effort is normal.      Breath sounds: Normal breath sounds.   Abdominal:      General: Abdomen is flat. There is no distension.      Palpations: Abdomen is soft.      Tenderness: There is no abdominal tenderness.   Musculoskeletal:         General: Normal range of motion.      Cervical back: Normal range of motion.      Right lower leg: No edema.      Left lower leg: No edema.   Skin:     General: Skin is warm.      Coloration: Skin is not pale.   Neurological:      General: No focal deficit present.      Mental Status: She is alert and oriented to person, place, and time. Mental status is at baseline.   Psychiatric:         Mood and Affect: Mood normal.         Outpatient Follow-Up  Future Appointments   Date Time Provider Department Center   2/28/2025 10:30 AM Bonny Malloy, APRN-CNP [id number]         Wolfgang Beckwith DO

## 2024-07-07 NOTE — CONSULTS
Consults    C C : Dizziness after taking morning pills    History Of Present Illness  Le Granados is a 78 y.o. female presenting with dizziness that starts about 1-1/2 hours after she takes her morning pills she typically takes her blood pressure pills around 9:00 and yesterday around 1030 she started having dizziness it was not associated with headache blurring of vision loss of vision or focal weakness or numbness in face arm or leg.  Symptoms were transient lasted few minutes and then she became normal ever since she is in the hospital she is back to baseline.  No new symptoms.  It is not associated with any headache.    Patient has chronic deafness and she has not seen ENT.      Past Medical and Surgical History    Past Medical History:   Diagnosis Date    Breast cancer (Multi) 2021    lt side    COVID-19 12/17/2020    COVID-19 virus infection    Encounter for other preprocedural examination 03/05/2021    Preop examination    Other conditions influencing health status 12/02/2020    History of cough    Personal history of malignant neoplasm of breast     History of malignant neoplasm of breast    Personal history of other diseases of the circulatory system     History of hypertension    Personal history of other diseases of the musculoskeletal system and connective tissue 09/21/2017    History of bone disorder    Personal history of other diseases of the nervous system and sense organs 03/05/2021    History of otitis media    Personal history of other diseases of the respiratory system     History of pneumothorax    Personal history of other specified conditions 02/23/2018    History of abdominal pain    Personal history of other specified conditions     History of chest pain          Past Surgical History:   Procedure Laterality Date    BREAST BIOPSY Right 2008    bgn    BREAST LUMPECTOMY  09/25/2017    Breast Surgery Lumpectomy    CARPAL TUNNEL RELEASE  09/25/2017    Neuroplasty Decompression Median Nerve At  Carpal Tunnel    CATARACT EXTRACTION  09/25/2017    Cataract Extraction    CERVICAL FUSION  09/25/2017    Cervical Vertebral Fusion    HAND TENDON SURGERY  09/25/2017    Hand Incision Tendon Sheath Of A Finger    HYSTERECTOMY  09/25/2017    Hysterectomy    KNEE ARTHROSCOPY W/ DEBRIDEMENT  09/25/2017    Arthroscopy Knee Right    LUMBAR LAMINECTOMY  09/25/2017    Laminectomy Lumbar    OTHER SURGICAL HISTORY  09/25/2017    Oophorectomy - Bilateral (Removal Of Both Ovaries)    OTHER SURGICAL HISTORY  05/13/2021    Knee replacement    TYMPANOSTOMY TUBE PLACEMENT  09/25/2017    Ear Pressure Equalization Tube        Social History  Social History     Tobacco Use    Smoking status: Never    Smokeless tobacco: Never   Substance Use Topics    Alcohol use: Never    Drug use: Never     Allergies  Azithromycin, Clindamycin, Fish containing products, Nut - unspecified, Penicillins, Tetracyclines, Tree nuts, Simvastatin, Sulfamethoxazole-trimethoprim, and Clarithromycin  Medications Prior to Admission   Medication Sig Dispense Refill Last Dose    amLODIPine (Norvasc) 5 mg tablet Take 1 tablet (5 mg) by mouth once daily. 90 tablet 1 7/6/2024 at 0900    cetirizine HCl (ZYRTEC ORAL) ZyrTEC Allergy   7/5/2024 at 0900    EPINEPHrine 0.3 mg/0.3 mL injection syringe INJECT AS DIRECTED FOR ANAPHYLAXIS (AND CALL 911 IF OCCURS) 2 each 1 Past Month    fluticasone (Flonase) 50 mcg/actuation nasal spray Administer 1 spray into each nostril once daily. Shake gently. Before first use, prime pump. After use, clean tip and replace cap. 16 g 11 7/5/2024 at 2200    metoprolol succinate XL (Toprol-XL) 50 mg 24 hr tablet TAKE 1 TABLET BY MOUTH EVERY DAY 90 tablet 1 7/6/2024 at 0900    multivitamin tablet Take 1 tablet by mouth once daily at bedtime.   7/6/2024 at 0900    omeprazole (PriLOSEC) 40 mg DR capsule TAKE 1 CAPSULE BY MOUTH EVERY DAY 90 capsule 1 7/6/2024 at 0900    prednisoLONE acetate (Pred-Forte) 1 % ophthalmic suspension Administer 1 drop  into affected eye(s) once daily.   7/5/2024 at 2200    meclizine (Antivert) 25 mg tablet Take 1 tablet (25 mg) by mouth 3 times a day as needed for dizziness. (Patient not taking: Reported on 7/6/2024) 30 tablet 0 More than a month       Review of Systems   Constitutional:  Negative for activity change, appetite change, chills, diaphoresis, fatigue and fever.   HENT:  Positive for hearing loss. Negative for congestion, dental problem, drooling, ear discharge, sinus pressure, sinus pain, sneezing, sore throat, tinnitus, trouble swallowing and voice change.         Chronic and bilateral   Eyes:  Negative for pain, discharge, redness and itching.   Respiratory:  Negative for apnea, cough, choking, chest tightness, shortness of breath, wheezing and stridor.    Cardiovascular:  Negative for chest pain, palpitations and leg swelling.   Gastrointestinal:  Negative for abdominal distention, abdominal pain, anal bleeding, blood in stool, constipation, diarrhea, nausea, rectal pain and vomiting.   Endocrine: Negative for cold intolerance, heat intolerance and polydipsia.   Genitourinary:  Negative for difficulty urinating, dysuria, enuresis, flank pain, frequency and genital sores.   Musculoskeletal:  Negative for arthralgias, back pain, gait problem, joint swelling, myalgias, neck pain and neck stiffness.   Skin:  Negative for color change, pallor, rash and wound.   Allergic/Immunologic: Negative for environmental allergies, food allergies and immunocompromised state.   Neurological:  Negative for dizziness, seizures, facial asymmetry, speech difficulty, light-headedness, numbness and headaches.   Hematological:  Negative for adenopathy. Does not bruise/bleed easily.   Psychiatric/Behavioral:  Negative for agitation, behavioral problems, confusion, decreased concentration, dysphoric mood, hallucinations, self-injury, sleep disturbance and suicidal ideas. The patient is not nervous/anxious and is not hyperactive.   "      Cardiovascular system: S1-S2 intact  Respiratory clear to auscultation bilateral on deep breathing he feels irritability on the left side of the chest.    Neurological Exam      Last Recorded Vitals  Blood pressure 130/66, pulse 82, temperature 36.5 °C (97.7 °F), temperature source Temporal, resp. rate 18, height 1.6 m (5' 3\"), weight 85.7 kg (189 lb), SpO2 95%.    Relevant Results      Current Facility-Administered Medications:     acetaminophen (Tylenol) tablet 650 mg, 650 mg, oral, q6h PRN, Luba Cole DO    amLODIPine (Norvasc) tablet 5 mg, 5 mg, oral, Daily, Luba Cole DO, 5 mg at 07/07/24 0911    cetirizine (ZyrTEC) tablet 10 mg, 10 mg, oral, Daily, Luba Cole DO, 10 mg at 07/07/24 1122    enoxaparin (Lovenox) syringe 40 mg, 40 mg, subcutaneous, q24h, Luba Cole DO    fluticasone (Flonase) nasal spray 1 spray, 1 spray, Each Nostril, Daily, Luba Cole DO, 1 spray at 07/06/24 2025    meclizine (Antivert) tablet 25 mg, 25 mg, oral, TID PRN, Luba Cole DO    metoprolol succinate XL (Toprol-XL) 24 hr tablet 50 mg, 50 mg, oral, Daily, Luba Cole DO, 50 mg at 07/07/24 0911    ondansetron (Zofran) injection 4 mg, 4 mg, intravenous, q6h PRN, Luba Cole DO    pantoprazole (ProtoNix) EC tablet 40 mg, 40 mg, oral, Daily before breakfast, Luba Cole DO, 40 mg at 07/07/24 1122    polyethylene glycol (Glycolax, Miralax) packet 17 g, 17 g, oral, Daily PRN, Luba Cole DO    prednisoLONE acetate (Pred-Forte) 1 % ophthalmic suspension 1 drop, 1 drop, ophthalmic (eye), Nightly, Luba Cole DO, 1 drop at 07/06/24 2128    predniSONE (Deltasone) tablet 50 mg, 50 mg, oral, Daily, Luba Cole DO, 50 mg at 07/07/24 1123     Continuous medications    PRN medications, reviewed    NIH Stroke Scale  1A. Level of Consciousness: Alert, Keenly Responsive  1B. Ask Month and Age: Both Questions Right  1C. Blink Eyes & Squeeze Hands: Performs Both Tasks  2. Best Gaze: Normal  3. Visual: No Visual Loss  4. Facial Palsy: " Normal Symmetrical Movements  5A. Motor - Left Arm: No Drift  5B. Motor - Right Arm: No Drift  6A. Motor - Left Leg: No Drift  6B. Motor - Right Leg: No Drift  7. Limb Ataxia: Absent  8. Sensory Loss: Normal  9. Best Language: No Aphasia  10. Dysarthria: Normal  11. Extinction and Inattention: No Abnormality  NIH Stroke Scale: 0           [place] Coma Scale  Best Eye Response: Spontaneous  Best Verbal Response: Oriented  Best Motor Response: Follows commands  Vahid Coma Scale Score: 15                I have personally reviewed the following imaging for brain (CT/ MR) and results and discussed in detail with the patient/care giver/family       Assessment/Plan     Medication joepsh effects She uses Benadryl OTC at night to control allergies and in morning she uses BP medication.    Keep a log of BP/P check for Orthostatic hypotension too.    Stop Benadryl    Use Montelukast 10 mg q da along with Cetrizine for Nasal Allergy. Out patient allergy and ENT consult for deafness and dizziness. Vestibulo Nystagmography to better localizes the disease process    Patient/Family Education: Extensive time was spent educating the patient on relevant anatomy, clinical findings and imaging, as well as discussing the potential diagnoses as discussed above.  Pharmacology: as above. Exercise: I discussed the importance of maintaining a daily exercise program, including stretching and strengthening. Preventative strategies were reviewed, specifically avoidance of any exercises that exacerbate pain.Return to online virtual visit/ clinic visit for follow-up with ENT and Neurologiy in 4 weeks or sooner as needed.The patient expressed understanding and agreement with the assessment and plan.  Patient encouraged to contact us should they have any questions, concerns, or any changes in symptoms. Thank you for allowing me to participate in the care of your patient.** This note is created using speech recognition transcription software. Despite  proofreading, several typographical errors might be present that might affect the meaning of the content. Please call with any questions.**          Jovani Soriano MD

## 2024-07-07 NOTE — PROGRESS NOTES
07/07/24 1237   Discharge Planning   Living Arrangements Spouse/significant other   Support Systems Spouse/significant other   Assistance Needed A&0x3, independent with ADL's, no DME, drives, room air, no cpap or bipap, not active with any HHC   Type of Residence Private residence   Number of Stairs to Enter Residence 2   Number of Stairs Within Residence 10   Do you have animals or pets at home? Yes   Type of Animals or Pets dog   Who is requesting discharge planning? Provider   Home or Post Acute Services None   Patient expects to be discharged to: home with spouse, denies any HHC needs, spouse to transport home   Does the patient need discharge transport arranged? No   Financial Resource Strain   How hard is it for you to pay for the very basics like food, housing, medical care, and heating? Not hard   Housing Stability   In the last 12 months, was there a time when you were not able to pay the mortgage or rent on time? N   In the last 12 months, how many places have you lived? 1   In the last 12 months, was there a time when you did not have a steady place to sleep or slept in a shelter (including now)? N   Transportation Needs   In the past 12 months, has lack of transportation kept you from medical appointments or from getting medications? no   In the past 12 months, has lack of transportation kept you from meetings, work, or from getting things needed for daily living? No

## 2024-07-10 ENCOUNTER — OFFICE VISIT (OUTPATIENT)
Dept: OTOLARYNGOLOGY | Facility: CLINIC | Age: 79
End: 2024-07-10
Payer: MEDICARE

## 2024-07-10 ENCOUNTER — CLINICAL SUPPORT (OUTPATIENT)
Dept: AUDIOLOGY | Facility: CLINIC | Age: 79
End: 2024-07-10
Payer: MEDICARE

## 2024-07-10 ENCOUNTER — PATIENT OUTREACH (OUTPATIENT)
Dept: CARE COORDINATION | Facility: CLINIC | Age: 79
End: 2024-07-10

## 2024-07-10 VITALS — BODY MASS INDEX: 32.78 KG/M2 | WEIGHT: 185 LBS | HEIGHT: 63 IN | TEMPERATURE: 93.3 F

## 2024-07-10 DIAGNOSIS — H90.3 ASYMMETRICAL SENSORINEURAL HEARING LOSS: ICD-10-CM

## 2024-07-10 DIAGNOSIS — J31.0 CHRONIC RHINITIS: Primary | ICD-10-CM

## 2024-07-10 DIAGNOSIS — R42 DIZZINESS: ICD-10-CM

## 2024-07-10 DIAGNOSIS — H90.3 SENSORINEURAL HEARING LOSS (SNHL) OF BOTH EARS: ICD-10-CM

## 2024-07-10 DIAGNOSIS — R42 DIZZINESS: Primary | ICD-10-CM

## 2024-07-10 PROCEDURE — 92557 COMPREHENSIVE HEARING TEST: CPT | Performed by: AUDIOLOGIST

## 2024-07-10 PROCEDURE — 1159F MED LIST DOCD IN RCRD: CPT | Performed by: OTOLARYNGOLOGY

## 2024-07-10 PROCEDURE — 1036F TOBACCO NON-USER: CPT | Performed by: OTOLARYNGOLOGY

## 2024-07-10 PROCEDURE — 99214 OFFICE O/P EST MOD 30 MIN: CPT | Performed by: OTOLARYNGOLOGY

## 2024-07-10 PROCEDURE — 92550 TYMPANOMETRY & REFLEX THRESH: CPT | Performed by: AUDIOLOGIST

## 2024-07-10 PROCEDURE — 1160F RVW MEDS BY RX/DR IN RCRD: CPT | Performed by: OTOLARYNGOLOGY

## 2024-07-10 RX ORDER — AZELASTINE 1 MG/ML
SPRAY, METERED NASAL
Qty: 30 ML | Refills: 11 | Status: SHIPPED | OUTPATIENT
Start: 2024-07-10

## 2024-07-10 SDOH — ECONOMIC STABILITY: GENERAL: WOULD YOU LIKE HELP WITH ANY OF THE FOLLOWING NEEDS?: I DONT NEED HELP WITH ANY OF THESE

## 2024-07-10 SDOH — ECONOMIC STABILITY: FOOD INSECURITY
ARE ANY OF YOUR NEEDS URGENT? FOR EXAMPLE, UNCERTAINTY OF WHERE YOU WILL GET YOUR NEXT MEAL OR NOT HAVING THE MEDICATIONS YOU NEED TO TAKE TOMORROW.: NO

## 2024-07-10 NOTE — PROGRESS NOTES
HPI  Le Granados is a 78 y.o. female Presents to discuss fluctuating dizziness.  She had severe episode about 10 days ago lasting about 8 hours.  She has not noticed fluctuating hearing.  She has a different type of dizziness that occurs primarily when she looks up.  She has had history of negative MRI.  Symptoms have been recurrent for quite some time but seem to be worse now.Audiogram today with low-frequency left greater than right sensorineural hearing loss      Past Medical History:   Diagnosis Date    Breast cancer (Multi) 2021    lt side    COVID-19 12/17/2020    COVID-19 virus infection    Encounter for other preprocedural examination 03/05/2021    Preop examination    Other conditions influencing health status 12/02/2020    History of cough    Personal history of malignant neoplasm of breast     History of malignant neoplasm of breast    Personal history of other diseases of the circulatory system     History of hypertension    Personal history of other diseases of the musculoskeletal system and connective tissue 09/21/2017    History of bone disorder    Personal history of other diseases of the nervous system and sense organs 03/05/2021    History of otitis media    Personal history of other diseases of the respiratory system     History of pneumothorax    Personal history of other specified conditions 02/23/2018    History of abdominal pain    Personal history of other specified conditions     History of chest pain            Medications:     Current Outpatient Medications:     amLODIPine (Norvasc) 5 mg tablet, Take 1 tablet (5 mg) by mouth once daily., Disp: 90 tablet, Rfl: 1    cetirizine HCl (ZYRTEC ORAL), ZyrTEC Allergy, Disp: , Rfl:     EPINEPHrine 0.3 mg/0.3 mL injection syringe, INJECT AS DIRECTED FOR ANAPHYLAXIS (AND CALL 911 IF OCCURS), Disp: 2 each, Rfl: 1    fluticasone (Flonase) 50 mcg/actuation nasal spray, Administer 1 spray into each nostril once daily. Shake gently. Before first use,  "prime pump. After use, clean tip and replace cap., Disp: 16 g, Rfl: 11    meclizine (Antivert) 25 mg tablet, Take 1 tablet (25 mg) by mouth 3 times a day as needed for dizziness., Disp: 30 tablet, Rfl: 0    metoprolol succinate XL (Toprol-XL) 50 mg 24 hr tablet, TAKE 1 TABLET BY MOUTH EVERY DAY, Disp: 90 tablet, Rfl: 1    multivitamin tablet, Take 1 tablet by mouth once daily at bedtime., Disp: , Rfl:     omeprazole (PriLOSEC) 40 mg DR capsule, TAKE 1 CAPSULE BY MOUTH EVERY DAY, Disp: 90 capsule, Rfl: 1    prednisoLONE acetate (Pred-Forte) 1 % ophthalmic suspension, Administer 1 drop into affected eye(s) once daily., Disp: , Rfl:     predniSONE (Deltasone) 10 mg tablet, Please take 3 tabs (30 mg) PO x 2 days, followed by 2 tabs (20 mg) x2 days. 1 tab (10 mg) x2 days, then 1/2 tab (5 mg) x 2 days, then stop, Disp: 13 tablet, Rfl: 0     Allergies:  Allergies   Allergen Reactions    Azithromycin Unknown, Anaphylaxis, Diarrhea and Other    Clindamycin Unknown, Anaphylaxis, Other and Itching    Fish Containing Products Anaphylaxis    Nut - Unspecified Anaphylaxis    Penicillins Anaphylaxis and Unknown    Tetracyclines Anaphylaxis, Swelling and Unknown     Severe facial  And tongue swelling    Tree Nuts Anaphylaxis and Unknown    Simvastatin Other and Unknown     Severe muscle pain    Sulfamethoxazole-Trimethoprim Other    Clarithromycin Rash, Unknown, Swelling and Other     rash        Physical Exam:  Last Recorded Vitals  Temperature 34.1 °C (93.3 °F), height 1.6 m (5' 3\"), weight 83.9 kg (185 lb).  General:     General appearance: Well-developed, well-nourished in no acute distress.       Voice:  normal       Head/face: Normal appearance; nontender to palpation     Facial nerve function: Normal and symmetric bilaterally.    Oral/oropharynx:     Oral vestibule: Normal labial and gingival mucosa     Tongue/floor of mouth: Normal without lesion     Oropharynx: Clear.  No lesions present of the hard/soft palate, posterior " pharynx    Neck:     Neck: Normal appearance, trachea midline     Salivary glands: Normal to palpation bilaterally     Lymph nodes: No cervical lymphadenopathy to palpation     Thyroid: No thyromegaly.  No palpable nodules     Range of motion: Normal    Neurological:     Cortical functions: Alert and oriented x3, appropriate affect       Larynx/hypopharynx:     Laryngeal findings: Mirror exam inadequate or limited secondary to enlarged base of tongue and/or excessive gagging    Ear:     Ear canal: Normal bilaterally     Tympanic membrane: Intact and mobile bilaterally     Pinna: Normal bilaterally     Hearing:  Gross hearing assessment normal by voice    Nose:     Visualized using: Anterior rhinoscopy     Nasopharynx: Inadequate mirror exam secondary to gag, anatomy.       Nasal dorsum: Nontraumatic midline appearance     Septum: Midline     Inferior turbinates: Normally sized     Mucosa: Bilateral, pink, normal appearing       ASSESSMENT/PLAN:      Possibility of Ménière's disease.  Recommend salt restricted diet.  Recommend increased hydration.  She will take meclizine as needed.  She may call if symptoms breakthrough and she requires further treatment such as Valium.  In the meantime we will work on VNG and ECOG to better evaluate.  I will see her back in a month, sooner as needed            Richmond Mccarthy MD

## 2024-07-10 NOTE — PROGRESS NOTES
First attempt made to reach patient for transitions of care outreach and no contact made with patient. Left voicemail with my name and contact information.

## 2024-07-11 NOTE — PROGRESS NOTES
AUDIOLOGY ADULT AUDIOMETRIC EVALUATION    Name:  Le Granados  :  1945  Age:  78 y.o.  Date of Evaluation:  July 10, 2024    Reason for visit: Ms. Granados is seen in the clinic today at the request of Richmond Mccarthy MD in otolaryngology for an audiologic evaluation.     HISTORY  The patient has been experiencing episodes of dizziness.  She has a history of bilateral hearing loss and wears binaural hearing aids.  Occasional bilateral aural pressure was reported and tinnitus and otalgia were denied.     EVALUATION  See scanned audiogram: “Media” > “Audiology Report”.      RESULTS  Otoscopic Evaluation:  Right Ear: clear ear canal  Left Ear: clear ear canal    Immittance Measures:  Tympanometry:  Right Ear: Type As, reduced tympanic membrane mobility with normal middle ear pressure   Left Ear: Type B, reduced tympanic membrane mobility     Acoustic Reflexes:  Ipsilateral Right Ear: Absent at 500-4000 Hz   Ipsilateral Left Ear: Absent at 500-4000 Hz   Contralateral Right Ear: did not evaluate  Contralateral Left Ear: did not evaluate    Distortion Product Otoacoustic Emissions (DPOAEs):  Right Ear: did not evaluate   Left Ear: did not evaluate     Audiometry:  Test Technique and Reliability:   Standard audiometry via supra-aural headphones. Reliability is good.    Pure tone air and bone conduction audiometry:  Right Ear: mild sloping to moderately-severe sensorineural hearing loss   Left Ear: moderately-severe sensorineural hearing loss     Speech Audiometry (Word Recognition Scores):   Right Ear: Excellent, 92%   Left Ear: Fair, 76%    IMPRESSIONS  Results of today's audiometric evaluation revealed a bilateral sensorineural hearing loss.   The presence of acoustic reflexes within normal intensity limits is consistent with normal middle ear and brainstem function, and suggests that auditory sensitivity is not significantly impaired. An elevated or absent acoustic reflex threshold is consistent with a middle  ear disorder, hearing loss in the stimulated ear, and/or interruption of neural innervation of the stapedius muscle.     RECOMMENDATIONS  - Follow up with otolaryngology today as scheduled.  - Audiologic evaluation in conjunction with otologic care, if an acute change is noted, and/or annually.  - Continued hearing aid use.  - Follow-up with audiology annually for routine hearing aid maintenance, sooner if questions/problems arise.  - Follow-up with medical care team as planned.    PATIENT EDUCATION  Discussed results, impressions and recommendations with the patient. Questions were addressed and the patient was encouraged to contact our office should concerns arise.    Time for this encounter: 10:30-11:00    Yara Abreu M.A., CCC-A   Licensed Audiologist

## 2024-07-13 LAB
ATRIAL RATE: 80 BPM
P AXIS: 53 DEGREES
P OFFSET: 179 MS
P ONSET: 115 MS
PR INTERVAL: 226 MS
Q ONSET: 228 MS
QRS COUNT: 13 BEATS
QRS DURATION: 76 MS
QT INTERVAL: 354 MS
QTC CALCULATION(BAZETT): 408 MS
QTC FREDERICIA: 389 MS
R AXIS: 7 DEGREES
T AXIS: 2 DEGREES
T OFFSET: 405 MS
VENTRICULAR RATE: 80 BPM

## 2024-08-06 ENCOUNTER — APPOINTMENT (OUTPATIENT)
Dept: AUDIOLOGY | Facility: CLINIC | Age: 79
End: 2024-08-06
Payer: MEDICARE

## 2024-08-06 DIAGNOSIS — R42 DIZZINESS: Primary | ICD-10-CM

## 2024-08-06 PROCEDURE — 92584 ELECTROCOCHLEOGRAPHY: CPT | Performed by: AUDIOLOGIST

## 2024-08-06 PROCEDURE — 92540 BASIC VESTIBULAR EVALUATION: CPT | Performed by: AUDIOLOGIST

## 2024-08-06 PROCEDURE — 92537 CALORIC VSTBLR TEST W/REC: CPT | Performed by: AUDIOLOGIST

## 2024-08-06 NOTE — PROGRESS NOTES
"REPORT FOR VIDEONYSTAGMOGRAPHY (VNG)    History: Patient referred by Richmond Mccarthy MD for Videonystagmography.   Patient reports a significant episode of her spinning, inability to walk, nausea lasting 7 hours in May 2024.   She went to the emergency department; CT Scan, MRI reportedly negative.   She has improved; has minor episodes when she gets up too quick; usually in the morning.    She reports that mostly she's been doing well; has been able to golf 5 days a week.  She reports significant neck history; 2 surgeries involving \"fusion\".   Occasional pressure in both ears reported.    Otoscopy: Clear ear canals bilaterally.     Test Parameters:  Ocular Motility Testing to visually guided targets was conducted using a dual channel video-recording technique for the recording of eye movement in the horizontal and vertical planes.  Bithermal air caloric testing was performed at 48 degrees C and 24 degrees C.      Random Saccades: Normal; normal accuracy, latency and velocity.      Gaze Tests: Normal;  Normal gaze to left, right, up, and down targets.     Smooth Pursuit: Normal   Optokinetic: Normal: Peak slow-phase velocity (SPV) within normal limits.    Spontaneous Nystagmus: Normal; no clinically significant nystagmus recorded  [place] Hallpike: Abnormal: Hallpike head left demonstrated positional, persistent, rotary nystagmus; primarily left beating.  *no dizziness; just \"felt eyes moving\"  Hallpike head right demonstrated no clinically significant nystagmus recorded; no patient report dizziness.    Positionals: Normal: no clinically significant nystagmus reported in seated, supine, head right or head left positions.     Calorics: Abnormal: Bilateral weakness demonstrated; total eye speed <30 dgs/sec     RW:  6       LW: 10     RC:   6            Caloric weakness: Bilateral weakness      Directional Preponderance: NOT Significant      Failure of Fixation Suppression: Negative     vHIT: Video Head Impulse Test  " *did not perform; history of two neck fusions    *PLEASE SEE SCANNED IN REPORT AND RECORDINGS.      Impressions: Abnormal VNG     Oculomotor performance demonstrated within normal limits.       Positional tests demonstrated persistent positional rotary nystagmus for hallpike head left suggesting possible CNS pathology    Caloric responses demonstrated a bilateral weakness suggesting bilateral peripheral vestibulopathy and/or CNS pathology.          Recommendations: Follow-up with Dr. Mccarthy.      Ruthie Jiang M.A., CCC/A

## 2024-08-09 NOTE — PROGRESS NOTES
"Report for Electrocochleography (EcochG)     History: Patient seen for EcochG per  referral.   Patient reports a significant episode of her spinning, inability to walk, nausea lasting 7 hours in May 2024.   She went to the emergency department; CT Scan, MRI reportedly negative.   She has improved; has minor episodes when she gets up too quick; usually in the morning.    She reports that mostly she's been doing well; has been able to golf 5 days a week.  She reports significant neck history; 2 surgeries involving \"fusion\".   Occasional pressure in both ears reported.     Results: EcochG results demonstrated SP/AP ratios within normal limits bilaterally.  Right ear SP/AP ratio at 19% and Left ear SP/AP ratio at 14%       *PLEASE SEE SCANNED IN RESULTS.      Recommendations:  Follow-up with Dr. Mccarthy.      Ruthie Jiang M.A., CCC/A     "

## 2024-08-12 ENCOUNTER — APPOINTMENT (OUTPATIENT)
Dept: OTOLARYNGOLOGY | Facility: CLINIC | Age: 79
End: 2024-08-12
Payer: MEDICARE

## 2024-08-12 VITALS — TEMPERATURE: 95.5 F | WEIGHT: 188 LBS | BODY MASS INDEX: 33.31 KG/M2 | HEIGHT: 63 IN

## 2024-08-12 DIAGNOSIS — H90.3 ASYMMETRICAL SENSORINEURAL HEARING LOSS: ICD-10-CM

## 2024-08-12 DIAGNOSIS — R42 DIZZINESS: Primary | ICD-10-CM

## 2024-08-12 PROCEDURE — 1036F TOBACCO NON-USER: CPT | Performed by: OTOLARYNGOLOGY

## 2024-08-12 PROCEDURE — 99214 OFFICE O/P EST MOD 30 MIN: CPT | Performed by: OTOLARYNGOLOGY

## 2024-08-12 PROCEDURE — 1159F MED LIST DOCD IN RCRD: CPT | Performed by: OTOLARYNGOLOGY

## 2024-08-12 NOTE — PROGRESS NOTES
HPI  Le Granados is a 78 y.o. female follow-up ECOG which was within normal limits.  Her symptoms are improving.  She has rare dizziness at this point.  Hearing is stable.    Prior history: Presents to discuss fluctuating dizziness.  She had severe episode about 10 days ago lasting about 8 hours.  She has not noticed fluctuating hearing.  She has a different type of dizziness that occurs primarily when she looks up.  She has had history of negative MRI.  Symptoms have been recurrent for quite some time but seem to be worse now.Audiogram today with low-frequency left greater than right sensorineural hearing loss      Past Medical History:   Diagnosis Date    Breast cancer (Multi) 2021    lt side    COVID-19 12/17/2020    COVID-19 virus infection    Encounter for other preprocedural examination 03/05/2021    Preop examination    Other conditions influencing health status 12/02/2020    History of cough    Personal history of malignant neoplasm of breast     History of malignant neoplasm of breast    Personal history of other diseases of the circulatory system     History of hypertension    Personal history of other diseases of the musculoskeletal system and connective tissue 09/21/2017    History of bone disorder    Personal history of other diseases of the nervous system and sense organs 03/05/2021    History of otitis media    Personal history of other diseases of the respiratory system     History of pneumothorax    Personal history of other specified conditions 02/23/2018    History of abdominal pain    Personal history of other specified conditions     History of chest pain            Medications:     Current Outpatient Medications:     amLODIPine (Norvasc) 5 mg tablet, Take 1 tablet (5 mg) by mouth once daily., Disp: 90 tablet, Rfl: 1    azelastine (Astelin) 137 mcg (0.1 %) nasal spray, Use 2 sprays in each nostril twice daily, Disp: 30 mL, Rfl: 11    cetirizine HCl (ZYRTEC ORAL), ZyrTEC Allergy, Disp: , Rfl:  "    EPINEPHrine 0.3 mg/0.3 mL injection syringe, INJECT AS DIRECTED FOR ANAPHYLAXIS (AND CALL 911 IF OCCURS), Disp: 2 each, Rfl: 1    fluticasone (Flonase) 50 mcg/actuation nasal spray, Administer 1 spray into each nostril once daily. Shake gently. Before first use, prime pump. After use, clean tip and replace cap., Disp: 16 g, Rfl: 11    meclizine (Antivert) 25 mg tablet, Take 1 tablet (25 mg) by mouth 3 times a day as needed for dizziness., Disp: 30 tablet, Rfl: 0    metoprolol succinate XL (Toprol-XL) 50 mg 24 hr tablet, TAKE 1 TABLET BY MOUTH EVERY DAY, Disp: 90 tablet, Rfl: 1    multivitamin tablet, Take 1 tablet by mouth once daily at bedtime., Disp: , Rfl:     omeprazole (PriLOSEC) 40 mg DR capsule, TAKE 1 CAPSULE BY MOUTH EVERY DAY, Disp: 90 capsule, Rfl: 1    prednisoLONE acetate (Pred-Forte) 1 % ophthalmic suspension, Administer 1 drop into affected eye(s) once daily., Disp: , Rfl:     predniSONE (Deltasone) 10 mg tablet, Please take 3 tabs (30 mg) PO x 2 days, followed by 2 tabs (20 mg) x2 days. 1 tab (10 mg) x2 days, then 1/2 tab (5 mg) x 2 days, then stop, Disp: 13 tablet, Rfl: 0     Allergies:  Allergies   Allergen Reactions    Azithromycin Unknown, Anaphylaxis, Diarrhea and Other    Clindamycin Unknown, Anaphylaxis, Other and Itching    Fish Containing Products Anaphylaxis    Nut - Unspecified Anaphylaxis    Penicillins Anaphylaxis and Unknown    Tetracyclines Anaphylaxis, Swelling and Unknown     Severe facial  And tongue swelling    Tree Nuts Anaphylaxis and Unknown    Simvastatin Other and Unknown     Severe muscle pain    Sulfamethoxazole-Trimethoprim Other    Clarithromycin Rash, Unknown, Swelling and Other     rash        Physical Exam:  Last Recorded Vitals  Temperature 35.3 °C (95.5 °F), height 1.6 m (5' 3\"), weight 85.3 kg (188 lb).  General:     General appearance: Well-developed, well-nourished in no acute distress.       Voice:  normal       Head/face: Normal appearance; nontender to " palpation     Facial nerve function: Normal and symmetric bilaterally.    Oral/oropharynx:     Oral vestibule: Normal labial and gingival mucosa     Tongue/floor of mouth: Normal without lesion     Oropharynx: Clear.  No lesions present of the hard/soft palate, posterior pharynx    Neck:     Neck: Normal appearance, trachea midline     Salivary glands: Normal to palpation bilaterally     Lymph nodes: No cervical lymphadenopathy to palpation     Thyroid: No thyromegaly.  No palpable nodules     Range of motion: Normal    Neurological:     Cortical functions: Alert and oriented x3, appropriate affect       Larynx/hypopharynx:     Laryngeal findings: Mirror exam inadequate or limited secondary to enlarged base of tongue and/or excessive gagging    Ear:     Ear canal: Normal bilaterally     Tympanic membrane: Intact and mobile bilaterally     Pinna: Normal bilaterally     Hearing:  Gross hearing assessment normal by voice    Nose:     Visualized using: Anterior rhinoscopy     Nasopharynx: Inadequate mirror exam secondary to gag, anatomy.       Nasal dorsum: Nontraumatic midline appearance     Septum: Midline     Inferior turbinates: Normally sized     Mucosa: Bilateral, pink, normal appearing       ASSESSMENT/PLAN:    She seems to be improving.  This could possibly be viral.  Recommend give this a bit more time.  We discussed physical therapy and she is comfortable holding on this for now.  She will call if it worsens or if her symptoms persist in any way in a month            Richmond Mccarthy MD

## 2024-10-02 DIAGNOSIS — I10 ESSENTIAL (PRIMARY) HYPERTENSION: ICD-10-CM

## 2024-10-02 DIAGNOSIS — K21.9 GASTROESOPHAGEAL REFLUX DISEASE, UNSPECIFIED WHETHER ESOPHAGITIS PRESENT: ICD-10-CM

## 2024-10-03 RX ORDER — METOPROLOL SUCCINATE 50 MG/1
50 TABLET, EXTENDED RELEASE ORAL DAILY
Qty: 90 TABLET | Refills: 1 | Status: SHIPPED | OUTPATIENT
Start: 2024-10-03

## 2024-10-03 RX ORDER — OMEPRAZOLE 40 MG/1
40 CAPSULE, DELAYED RELEASE ORAL DAILY
Qty: 90 CAPSULE | Refills: 1 | Status: SHIPPED | OUTPATIENT
Start: 2024-10-03

## 2024-11-08 ENCOUNTER — OFFICE VISIT (OUTPATIENT)
Dept: URGENT CARE | Age: 79
End: 2024-11-08
Payer: MEDICARE

## 2024-11-08 VITALS
SYSTOLIC BLOOD PRESSURE: 155 MMHG | BODY MASS INDEX: 34.54 KG/M2 | TEMPERATURE: 97.7 F | OXYGEN SATURATION: 95 % | RESPIRATION RATE: 16 BRPM | DIASTOLIC BLOOD PRESSURE: 77 MMHG | HEART RATE: 67 BPM | WEIGHT: 195 LBS

## 2024-11-08 DIAGNOSIS — J01.10 ACUTE NON-RECURRENT FRONTAL SINUSITIS: Primary | ICD-10-CM

## 2024-11-08 RX ORDER — AZITHROMYCIN 250 MG/1
TABLET, FILM COATED ORAL
Qty: 6 TABLET | Refills: 0 | Status: SHIPPED | OUTPATIENT
Start: 2024-11-08

## 2024-11-08 RX ORDER — METHYLPREDNISOLONE 4 MG/1
TABLET ORAL
Qty: 21 TABLET | Refills: 0 | Status: SHIPPED | OUTPATIENT
Start: 2024-11-08

## 2024-11-08 ASSESSMENT — ENCOUNTER SYMPTOMS: SINUS COMPLAINT: 1

## 2024-11-08 NOTE — PROGRESS NOTES
Subjective   Patient ID: Le Granados is a 79 y.o. female. They present today with a chief complaint of Sinus Problem (Sinus congestion for 1.5 weeks).    History of Present Illness  79 yr old female presents with complaints of sinus pain, pressure, congestion, drainage along with headache and chest congestion x 10 days.  Patient states she has been using mucinex with little relief.  She denies any sob, cp, sore throat, n/v/d.       Sinus Problem      Past Medical History  Allergies as of 11/08/2024 - Reviewed 11/08/2024   Allergen Reaction Noted    Azithromycin Unknown, Anaphylaxis, Diarrhea, and Other 11/08/2023    Clindamycin Unknown, Anaphylaxis, Other, and Itching 06/09/2023    Fish containing products Anaphylaxis 06/02/2008    Nut - unspecified Anaphylaxis 06/02/2008    Penicillins Anaphylaxis and Unknown 11/02/2006    Tetracyclines Anaphylaxis, Swelling, and Unknown 11/02/2006    Tree nuts Anaphylaxis and Unknown 06/09/2023    Simvastatin Other and Unknown 01/18/2012    Sulfamethoxazole-trimethoprim Other 11/08/2023    Clarithromycin Rash, Unknown, Swelling, and Other 11/02/2006       (Not in a hospital admission)       Past Medical History:   Diagnosis Date    Breast cancer (Multi) 2021    lt side    COVID-19 12/17/2020    COVID-19 virus infection    Encounter for other preprocedural examination 03/05/2021    Preop examination    Other conditions influencing health status 12/02/2020    History of cough    Personal history of malignant neoplasm of breast     History of malignant neoplasm of breast    Personal history of other diseases of the circulatory system     History of hypertension    Personal history of other diseases of the musculoskeletal system and connective tissue 09/21/2017    History of bone disorder    Personal history of other diseases of the nervous system and sense organs 03/05/2021    History of otitis media    Personal history of other diseases of the respiratory system     History of  pneumothorax    Personal history of other specified conditions 02/23/2018    History of abdominal pain    Personal history of other specified conditions     History of chest pain       Past Surgical History:   Procedure Laterality Date    BREAST BIOPSY Right 2008    bgn    BREAST LUMPECTOMY  09/25/2017    Breast Surgery Lumpectomy    CARPAL TUNNEL RELEASE  09/25/2017    Neuroplasty Decompression Median Nerve At Carpal Tunnel    CATARACT EXTRACTION  09/25/2017    Cataract Extraction    CERVICAL FUSION  09/25/2017    Cervical Vertebral Fusion    HAND TENDON SURGERY  09/25/2017    Hand Incision Tendon Sheath Of A Finger    HYSTERECTOMY  09/25/2017    Hysterectomy    KNEE ARTHROSCOPY W/ DEBRIDEMENT  09/25/2017    Arthroscopy Knee Right    LUMBAR LAMINECTOMY  09/25/2017    Laminectomy Lumbar    OTHER SURGICAL HISTORY  09/25/2017    Oophorectomy - Bilateral (Removal Of Both Ovaries)    OTHER SURGICAL HISTORY  05/13/2021    Knee replacement    TYMPANOSTOMY TUBE PLACEMENT  09/25/2017    Ear Pressure Equalization Tube        reports that she has never smoked. She has never used smokeless tobacco. She reports that she does not drink alcohol and does not use drugs.    Review of Systems  Review of Systems   All other systems reviewed and are negative.                                 Objective    Vitals:    11/08/24 1224   BP: 155/77   BP Location: Left arm   Patient Position: Sitting   BP Cuff Size: Adult   Pulse: 67   Resp: 16   Temp: 36.5 °C (97.7 °F)   TempSrc: Oral   SpO2: 95%   Weight: 88.5 kg (195 lb)     No LMP recorded. Patient is postmenopausal.    Physical Exam  Vitals and nursing note reviewed.   Constitutional:       Appearance: Normal appearance.   HENT:      Head: Normocephalic.      Right Ear: Tympanic membrane, ear canal and external ear normal.      Left Ear: Hearing, tympanic membrane, ear canal and external ear normal.      Nose: Nasal tenderness, mucosal edema, congestion and rhinorrhea present.      Right  Turbinates: Enlarged and swollen.      Left Turbinates: Enlarged and swollen.      Right Sinus: Maxillary sinus tenderness and frontal sinus tenderness present.      Left Sinus: Maxillary sinus tenderness and frontal sinus tenderness present.      Mouth/Throat:      Mouth: Mucous membranes are moist.      Pharynx: Posterior oropharyngeal erythema and postnasal drip present.   Eyes:      Pupils: Pupils are equal, round, and reactive to light.   Cardiovascular:      Rate and Rhythm: Normal rate and regular rhythm.   Pulmonary:      Effort: Pulmonary effort is normal.      Breath sounds: Normal breath sounds.   Abdominal:      General: Bowel sounds are normal.      Palpations: Abdomen is soft.   Skin:     General: Skin is warm and dry.      Capillary Refill: Capillary refill takes less than 2 seconds.   Neurological:      General: No focal deficit present.      Mental Status: She is alert.   Psychiatric:         Mood and Affect: Mood normal.         Procedures    Point of Care Test & Imaging Results from this visit  No results found for this visit on 11/08/24.   No results found.    Diagnostic study results (if any) were reviewed by Crystal L Severino, APRN-CNP.    Assessment/Plan   Allergies, medications, history, and pertinent labs/EKGs/Imaging reviewed by Crystal L Severino, APRN-CNP.     Medical Decision Making  79 yr old female presents with complaints of sinus pain, pressure, congestion, drainage along with headache and chest congestion x 10 days.  Patient states she has been using mucinex with little relief.  She denies any sob, cp, sore throat, n/v/d.   Vital signs are stable, afebrile.  Chest clear, heart is regular, belly soft and nontender.  ENT exam as above consistent with acute bacterial sinusitis.  Patient requests Rx for Z-Perry and Medrol Dosepak, both are sent to her pharmacy.  Patient is discharged to home, Patient is educated on supportive care, return/ER precautions.  Patient is to follow-up with PCP  should symptoms persist or worsen, verbalizes understanding has no further questions      Orders and Diagnoses  Diagnoses and all orders for this visit:  Acute non-recurrent frontal sinusitis  -     methylPREDNISolone (Medrol Dospak) 4 mg tablets; Take as directed on package.  -     azithromycin (Zithromax) 250 mg tablet; Take 2 tabs (500 mg) by mouth today, than 1 daily for 4 days.      Medical Admin Record      Patient disposition: Home    Electronically signed by Crystal L Severino, APRN-CNP  12:36 PM

## 2024-11-16 ENCOUNTER — APPOINTMENT (OUTPATIENT)
Dept: RADIOLOGY | Facility: HOSPITAL | Age: 79
End: 2024-11-16
Payer: MEDICARE

## 2024-11-16 ENCOUNTER — HOSPITAL ENCOUNTER (EMERGENCY)
Facility: HOSPITAL | Age: 79
Discharge: HOME | End: 2024-11-16
Attending: EMERGENCY MEDICINE
Payer: MEDICARE

## 2024-11-16 ENCOUNTER — APPOINTMENT (OUTPATIENT)
Dept: CARDIOLOGY | Facility: HOSPITAL | Age: 79
End: 2024-11-16
Payer: MEDICARE

## 2024-11-16 VITALS
OXYGEN SATURATION: 95 % | HEART RATE: 85 BPM | BODY MASS INDEX: 33.31 KG/M2 | TEMPERATURE: 96.8 F | RESPIRATION RATE: 18 BRPM | HEIGHT: 63 IN | WEIGHT: 188 LBS | DIASTOLIC BLOOD PRESSURE: 89 MMHG | SYSTOLIC BLOOD PRESSURE: 156 MMHG

## 2024-11-16 DIAGNOSIS — R42 DIZZINESS: Primary | ICD-10-CM

## 2024-11-16 DIAGNOSIS — J30.2 SEASONAL ALLERGIES: ICD-10-CM

## 2024-11-16 DIAGNOSIS — R42 VERTIGO: ICD-10-CM

## 2024-11-16 LAB
ALBUMIN SERPL BCP-MCNC: 4.2 G/DL (ref 3.4–5)
ALP SERPL-CCNC: 72 U/L (ref 33–136)
ALT SERPL W P-5'-P-CCNC: 12 U/L (ref 7–45)
ANION GAP BLDV CALCULATED.4IONS-SCNC: 6 MMOL/L (ref 10–25)
ANION GAP SERPL CALC-SCNC: 14 MMOL/L (ref 10–20)
APPEARANCE UR: CLEAR
AST SERPL W P-5'-P-CCNC: 16 U/L (ref 9–39)
BASE EXCESS BLDV CALC-SCNC: 5.8 MMOL/L (ref -2–3)
BASOPHILS # BLD AUTO: 0.05 X10*3/UL (ref 0–0.1)
BASOPHILS NFR BLD AUTO: 0.9 %
BILIRUB SERPL-MCNC: 0.6 MG/DL (ref 0–1.2)
BILIRUB UR STRIP.AUTO-MCNC: NEGATIVE MG/DL
BNP SERPL-MCNC: 38 PG/ML (ref 0–99)
BODY TEMPERATURE: ABNORMAL
BUN SERPL-MCNC: 16 MG/DL (ref 6–23)
CA-I BLDV-SCNC: 1.21 MMOL/L (ref 1.1–1.33)
CALCIUM SERPL-MCNC: 9.1 MG/DL (ref 8.6–10.3)
CARDIAC TROPONIN I PNL SERPL HS: 4 NG/L (ref 0–13)
CARDIAC TROPONIN I PNL SERPL HS: 4 NG/L (ref 0–13)
CHLORIDE BLDV-SCNC: 98 MMOL/L (ref 98–107)
CHLORIDE SERPL-SCNC: 93 MMOL/L (ref 98–107)
CO2 SERPL-SCNC: 27 MMOL/L (ref 21–32)
COLOR UR: NORMAL
CREAT SERPL-MCNC: 0.69 MG/DL (ref 0.5–1.05)
EGFRCR SERPLBLD CKD-EPI 2021: 88 ML/MIN/1.73M*2
EOSINOPHIL # BLD AUTO: 0.15 X10*3/UL (ref 0–0.4)
EOSINOPHIL NFR BLD AUTO: 2.6 %
ERYTHROCYTE [DISTWIDTH] IN BLOOD BY AUTOMATED COUNT: 12.3 % (ref 11.5–14.5)
FLUAV RNA RESP QL NAA+PROBE: NOT DETECTED
FLUBV RNA RESP QL NAA+PROBE: NOT DETECTED
GLUCOSE BLDV-MCNC: 117 MG/DL (ref 74–99)
GLUCOSE SERPL-MCNC: 119 MG/DL (ref 74–99)
GLUCOSE UR STRIP.AUTO-MCNC: NORMAL MG/DL
HCO3 BLDV-SCNC: 31.7 MMOL/L (ref 22–26)
HCT VFR BLD AUTO: 42.5 % (ref 36–46)
HCT VFR BLD EST: 41 % (ref 36–46)
HGB BLD-MCNC: 14.5 G/DL (ref 12–16)
HGB BLDV-MCNC: 13.8 G/DL (ref 12–16)
IMM GRANULOCYTES # BLD AUTO: 0.01 X10*3/UL (ref 0–0.5)
IMM GRANULOCYTES NFR BLD AUTO: 0.2 % (ref 0–0.9)
INHALED O2 CONCENTRATION: 21 %
KETONES UR STRIP.AUTO-MCNC: NEGATIVE MG/DL
LACTATE BLDV-SCNC: 1.6 MMOL/L (ref 0.4–2)
LACTATE SERPL-SCNC: 1.3 MMOL/L (ref 0.4–2)
LEUKOCYTE ESTERASE UR QL STRIP.AUTO: NEGATIVE
LYMPHOCYTES # BLD AUTO: 1.81 X10*3/UL (ref 0.8–3)
LYMPHOCYTES NFR BLD AUTO: 31.3 %
MAGNESIUM SERPL-MCNC: 1.81 MG/DL (ref 1.6–2.4)
MCH RBC QN AUTO: 28.8 PG (ref 26–34)
MCHC RBC AUTO-ENTMCNC: 34.1 G/DL (ref 32–36)
MCV RBC AUTO: 84 FL (ref 80–100)
MONOCYTES # BLD AUTO: 0.63 X10*3/UL (ref 0.05–0.8)
MONOCYTES NFR BLD AUTO: 10.9 %
NEUTROPHILS # BLD AUTO: 3.13 X10*3/UL (ref 1.6–5.5)
NEUTROPHILS NFR BLD AUTO: 54.1 %
NITRITE UR QL STRIP.AUTO: NEGATIVE
NRBC BLD-RTO: 0 /100 WBCS (ref 0–0)
OXYHGB MFR BLDV: 45.5 % (ref 45–75)
PCO2 BLDV: 50 MM HG (ref 41–51)
PH BLDV: 7.41 PH (ref 7.33–7.43)
PH UR STRIP.AUTO: 6 [PH]
PLATELET # BLD AUTO: 180 X10*3/UL (ref 150–450)
PO2 BLDV: 30 MM HG (ref 35–45)
POTASSIUM BLDV-SCNC: 4.2 MMOL/L (ref 3.5–5.3)
POTASSIUM SERPL-SCNC: 3.5 MMOL/L (ref 3.5–5.3)
PROT SERPL-MCNC: 6.8 G/DL (ref 6.4–8.2)
PROT UR STRIP.AUTO-MCNC: NEGATIVE MG/DL
RBC # BLD AUTO: 5.04 X10*6/UL (ref 4–5.2)
RBC # UR STRIP.AUTO: NEGATIVE /UL
SAO2 % BLDV: 47 % (ref 45–75)
SARS-COV-2 RNA RESP QL NAA+PROBE: NOT DETECTED
SODIUM BLDV-SCNC: 131 MMOL/L (ref 136–145)
SODIUM SERPL-SCNC: 130 MMOL/L (ref 136–145)
SP GR UR STRIP.AUTO: 1.01
UROBILINOGEN UR STRIP.AUTO-MCNC: NORMAL MG/DL
WBC # BLD AUTO: 5.8 X10*3/UL (ref 4.4–11.3)

## 2024-11-16 PROCEDURE — 87636 SARSCOV2 & INF A&B AMP PRB: CPT | Performed by: EMERGENCY MEDICINE

## 2024-11-16 PROCEDURE — 81003 URINALYSIS AUTO W/O SCOPE: CPT | Performed by: EMERGENCY MEDICINE

## 2024-11-16 PROCEDURE — 36415 COLL VENOUS BLD VENIPUNCTURE: CPT | Performed by: EMERGENCY MEDICINE

## 2024-11-16 PROCEDURE — 83880 ASSAY OF NATRIURETIC PEPTIDE: CPT | Performed by: EMERGENCY MEDICINE

## 2024-11-16 PROCEDURE — 85025 COMPLETE CBC W/AUTO DIFF WBC: CPT | Performed by: EMERGENCY MEDICINE

## 2024-11-16 PROCEDURE — 96360 HYDRATION IV INFUSION INIT: CPT

## 2024-11-16 PROCEDURE — 2500000004 HC RX 250 GENERAL PHARMACY W/ HCPCS (ALT 636 FOR OP/ED)

## 2024-11-16 PROCEDURE — 96361 HYDRATE IV INFUSION ADD-ON: CPT

## 2024-11-16 PROCEDURE — 99285 EMERGENCY DEPT VISIT HI MDM: CPT | Mod: 25

## 2024-11-16 PROCEDURE — 83605 ASSAY OF LACTIC ACID: CPT | Performed by: EMERGENCY MEDICINE

## 2024-11-16 PROCEDURE — 93005 ELECTROCARDIOGRAM TRACING: CPT

## 2024-11-16 PROCEDURE — 83735 ASSAY OF MAGNESIUM: CPT | Performed by: EMERGENCY MEDICINE

## 2024-11-16 PROCEDURE — 70450 CT HEAD/BRAIN W/O DYE: CPT

## 2024-11-16 PROCEDURE — 84132 ASSAY OF SERUM POTASSIUM: CPT | Mod: 59 | Performed by: EMERGENCY MEDICINE

## 2024-11-16 PROCEDURE — 70450 CT HEAD/BRAIN W/O DYE: CPT | Performed by: RADIOLOGY

## 2024-11-16 PROCEDURE — 84484 ASSAY OF TROPONIN QUANT: CPT | Performed by: EMERGENCY MEDICINE

## 2024-11-16 PROCEDURE — 84132 ASSAY OF SERUM POTASSIUM: CPT | Performed by: EMERGENCY MEDICINE

## 2024-11-16 RX ORDER — FLUTICASONE PROPIONATE 50 MCG
1 SPRAY, SUSPENSION (ML) NASAL 2 TIMES DAILY
Qty: 16 G | Refills: 0 | Status: SHIPPED | OUTPATIENT
Start: 2024-11-16 | End: 2025-11-16

## 2024-11-16 RX ORDER — MECLIZINE HYDROCHLORIDE 25 MG/1
25 TABLET ORAL 3 TIMES DAILY PRN
Qty: 15 TABLET | Refills: 0 | Status: SHIPPED | OUTPATIENT
Start: 2024-11-16 | End: 2024-11-21

## 2024-11-16 RX ADMIN — SODIUM CHLORIDE 1000 ML: 9 INJECTION, SOLUTION INTRAVENOUS at 20:19

## 2024-11-16 ASSESSMENT — COLUMBIA-SUICIDE SEVERITY RATING SCALE - C-SSRS
2. HAVE YOU ACTUALLY HAD ANY THOUGHTS OF KILLING YOURSELF?: NO
1. IN THE PAST MONTH, HAVE YOU WISHED YOU WERE DEAD OR WISHED YOU COULD GO TO SLEEP AND NOT WAKE UP?: NO
6. HAVE YOU EVER DONE ANYTHING, STARTED TO DO ANYTHING, OR PREPARED TO DO ANYTHING TO END YOUR LIFE?: NO

## 2024-11-16 ASSESSMENT — PAIN - FUNCTIONAL ASSESSMENT: PAIN_FUNCTIONAL_ASSESSMENT: 0-10

## 2024-11-16 ASSESSMENT — PAIN SCALES - GENERAL: PAINLEVEL_OUTOF10: 4

## 2024-11-16 NOTE — ED PROVIDER NOTES
HPI   Chief Complaint   Patient presents with    Dizziness     Pt sts she started to fell dizzy with nausea around 1500 today       Patient is a 79-year-old female with past medical history of hypertension, CAD, hyperlipidemia, and recent sinusitis, who presents for dizziness for the past 2 hours.  She states that it feels like her eyes are moving.  Denies that the room is spinning or that objects in her visual fields are spinning.  She denies any deficits in her visual fields.  Also denies any recent traumas, any headache, chest pain, shortness of breath, abdominal pain, numbness or weakness.  She also endorses some nausea with the dizziness.  She was recently prescribed a Z-Perry and Medrol Dosepak on November 8, which she had taken up until yesterday.  She states that the Z-Perry was giving her diarrhea.              Patient History   Past Medical History:   Diagnosis Date    Breast cancer (Multi) 2021    lt side    COVID-19 12/17/2020    COVID-19 virus infection    Encounter for other preprocedural examination 03/05/2021    Preop examination    Other conditions influencing health status 12/02/2020    History of cough    Personal history of malignant neoplasm of breast     History of malignant neoplasm of breast    Personal history of other diseases of the circulatory system     History of hypertension    Personal history of other diseases of the musculoskeletal system and connective tissue 09/21/2017    History of bone disorder    Personal history of other diseases of the nervous system and sense organs 03/05/2021    History of otitis media    Personal history of other diseases of the respiratory system     History of pneumothorax    Personal history of other specified conditions 02/23/2018    History of abdominal pain    Personal history of other specified conditions     History of chest pain     Past Surgical History:   Procedure Laterality Date    BREAST BIOPSY Right 2008    bgn    BREAST LUMPECTOMY  09/25/2017     Breast Surgery Lumpectomy    CARPAL TUNNEL RELEASE  09/25/2017    Neuroplasty Decompression Median Nerve At Carpal Tunnel    CATARACT EXTRACTION  09/25/2017    Cataract Extraction    CERVICAL FUSION  09/25/2017    Cervical Vertebral Fusion    HAND TENDON SURGERY  09/25/2017    Hand Incision Tendon Sheath Of A Finger    HYSTERECTOMY  09/25/2017    Hysterectomy    KNEE ARTHROSCOPY W/ DEBRIDEMENT  09/25/2017    Arthroscopy Knee Right    LUMBAR LAMINECTOMY  09/25/2017    Laminectomy Lumbar    OTHER SURGICAL HISTORY  09/25/2017    Oophorectomy - Bilateral (Removal Of Both Ovaries)    OTHER SURGICAL HISTORY  05/13/2021    Knee replacement    TYMPANOSTOMY TUBE PLACEMENT  09/25/2017    Ear Pressure Equalization Tube     Family History   Adopted: Yes   Problem Relation Name Age of Onset    No Known Problems Mother          adopted    No Known Problems Father          adopted     Social History     Tobacco Use    Smoking status: Never    Smokeless tobacco: Never   Substance Use Topics    Alcohol use: Never    Drug use: Never       Physical Exam   ED Triage Vitals [11/16/24 1827]   Temperature Heart Rate Respirations BP   36 °C (96.8 °F) 81 18 141/77      Pulse Ox Temp Source Heart Rate Source Patient Position   95 % Tympanic Monitor --      BP Location FiO2 (%)     Right arm --       Physical Exam  Constitutional:       Appearance: She is normal weight. She is not ill-appearing or diaphoretic.   HENT:      Head: Normocephalic and atraumatic.      Right Ear: External ear normal.      Left Ear: External ear normal.      Nose: Nose normal.      Mouth/Throat:      Mouth: Mucous membranes are moist.      Pharynx: No oropharyngeal exudate or posterior oropharyngeal erythema.   Eyes:      General:         Right eye: No discharge.         Left eye: No discharge.      Extraocular Movements: Extraocular movements intact.      Conjunctiva/sclera: Conjunctivae normal.   Cardiovascular:      Rate and Rhythm: Normal rate. Rhythm  irregular.      Pulses: Normal pulses.      Heart sounds: Normal heart sounds.   Pulmonary:      Effort: Pulmonary effort is normal. No respiratory distress.      Breath sounds: Normal breath sounds. No stridor. No wheezing, rhonchi or rales.   Abdominal:      General: Abdomen is flat. There is no distension.      Palpations: Abdomen is soft.      Tenderness: There is no abdominal tenderness.   Musculoskeletal:      Right lower leg: No edema.      Left lower leg: No edema.   Skin:     General: Skin is warm and dry.   Neurological:      General: No focal deficit present.      Mental Status: She is alert and oriented to person, place, and time.      Cranial Nerves: No cranial nerve deficit.      Sensory: No sensory deficit.      Motor: No weakness.   Psychiatric:         Mood and Affect: Mood normal.         Behavior: Behavior normal.         Thought Content: Thought content normal.           ED Course & MDM   ED Course as of 11/16/24 2224   Sat Nov 16, 2024 1856 WBC: 5.8 [MP]   1915 Lactate: 1.3 [MP]   1915 HEMOGLOBIN: 14.5 [MP]   1915 POCT pH, Venous: 7.41 [MP]   1916 POCT Sodium, Venous(!): 131 [MP]   1917 BNP: 38 [MP]   1918 Troponin I, High Sensitivity: 4 [MP]   1921 SODIUM(!): 130 [MP]   1922 CHLORIDE(!): 93 [MP]   1922 ECG 12 lead  EKG 12-lead: Heart rate 76, normal sinus rhythm, , normal axis, no ST segment elevation or T wave inversion [MP]   1929 Coronavirus 2019, PCR: Not Detected [MP]   1929 Influenza A, and B PCR  negative [MP]   2034 CT head wo IV contrast  1.  No acute intracranial hemorrhage or acute territorial infarct. [MP]   2107 Troponin I, High Sensitivity: 4 [MP]      ED Course User Index  [MP] Mo Deleon DO         Diagnoses as of 11/16/24 2224   Dizziness                 No data recorded     [place] Coma Scale Score: 15 (11/16/24 1829 : Moncho Wan RN)                           Medical Decision Making  Due to patient's medical history and presenting symptoms, it is appropriate to  rule out any intracranial process, like bleeding, mass, or ischemia.  Is also appropriate to rule out any testable respiratory infection, like COVID or flu, or acute heart failure exacerbation that could be causing hypoxia induced dizziness.  Other etiologies include continuation of the frontal sinusitis, dehydration from recent diarrhea, BPPV or otitis media.  Physical exam, lab work and imaging was negative for any acute intracranial process, covid, flu, cardiopulmonary process or neoplasm. She likely has the dizziness from vestibular dysfunction like Meniere's or BPPV, or it may be due to the recent sinus infection and the dehydration from the recent diarrhea. She did improve after fluid replacement. She is to be discharged hemodynamically stable and prescribed Flonase BID and meclizine TID prn. She is to follow up with her ENT specialist as soon as possible.          Procedure  Procedures     Mo Deleon DO  Resident  11/16/24 2662

## 2024-11-17 LAB — HOLD SPECIMEN: NORMAL

## 2024-11-19 LAB
ATRIAL RATE: 76 BPM
P AXIS: 65 DEGREES
P OFFSET: 173 MS
P ONSET: 126 MS
PR INTERVAL: 204 MS
Q ONSET: 228 MS
QRS COUNT: 12 BEATS
QRS DURATION: 74 MS
QT INTERVAL: 376 MS
QTC CALCULATION(BAZETT): 423 MS
QTC FREDERICIA: 406 MS
R AXIS: 50 DEGREES
T AXIS: 29 DEGREES
T OFFSET: 416 MS
VENTRICULAR RATE: 76 BPM

## 2024-11-26 DIAGNOSIS — R42 VERTIGO: ICD-10-CM

## 2024-11-27 RX ORDER — MECLIZINE HYDROCHLORIDE 25 MG/1
25 TABLET ORAL 3 TIMES DAILY PRN
Qty: 30 TABLET | Refills: 0 | Status: SHIPPED | OUTPATIENT
Start: 2024-11-27

## 2025-01-03 ENCOUNTER — OFFICE VISIT (OUTPATIENT)
Dept: PRIMARY CARE | Facility: CLINIC | Age: 80
End: 2025-01-03
Payer: MEDICARE

## 2025-01-03 VITALS
OXYGEN SATURATION: 95 % | BODY MASS INDEX: 33.66 KG/M2 | WEIGHT: 190 LBS | HEIGHT: 63 IN | SYSTOLIC BLOOD PRESSURE: 128 MMHG | DIASTOLIC BLOOD PRESSURE: 80 MMHG | HEART RATE: 81 BPM

## 2025-01-03 DIAGNOSIS — I10 BENIGN ESSENTIAL HYPERTENSION: ICD-10-CM

## 2025-01-03 DIAGNOSIS — Z00.00 MEDICARE ANNUAL WELLNESS VISIT, SUBSEQUENT: Primary | ICD-10-CM

## 2025-01-03 DIAGNOSIS — Z12.31 ENCOUNTER FOR SCREENING MAMMOGRAM FOR MALIGNANT NEOPLASM OF BREAST: ICD-10-CM

## 2025-01-03 DIAGNOSIS — E78.2 MODERATE MIXED HYPERLIPIDEMIA NOT REQUIRING STATIN THERAPY: ICD-10-CM

## 2025-01-03 DIAGNOSIS — E78.2 MIXED HYPERLIPIDEMIA: ICD-10-CM

## 2025-01-03 ASSESSMENT — ACTIVITIES OF DAILY LIVING (ADL)
GROCERY_SHOPPING: INDEPENDENT
BATHING: INDEPENDENT
DRESSING: INDEPENDENT
DOING_HOUSEWORK: INDEPENDENT
TAKING_MEDICATION: INDEPENDENT
MANAGING_FINANCES: INDEPENDENT

## 2025-01-03 ASSESSMENT — PATIENT HEALTH QUESTIONNAIRE - PHQ9
SUM OF ALL RESPONSES TO PHQ9 QUESTIONS 1 AND 2: 0
2. FEELING DOWN, DEPRESSED OR HOPELESS: NOT AT ALL
SUM OF ALL RESPONSES TO PHQ9 QUESTIONS 1 AND 2: 0
1. LITTLE INTEREST OR PLEASURE IN DOING THINGS: NOT AT ALL
1. LITTLE INTEREST OR PLEASURE IN DOING THINGS: NOT AT ALL
2. FEELING DOWN, DEPRESSED OR HOPELESS: NOT AT ALL

## 2025-01-03 ASSESSMENT — ENCOUNTER SYMPTOMS
OCCASIONAL FEELINGS OF UNSTEADINESS: 0
LOSS OF SENSATION IN FEET: 0
DEPRESSION: 0

## 2025-01-03 NOTE — PROGRESS NOTES
Subjective   Patient ID: Le Granados is a 79 y.o. female who presents for Establish Care (Refill meclizine ).    Past Medical, Surgical, and Family History reviewed and updated in chart.    Reviewed all medications by prescribing practitioner or clinical pharmacist (such as prescriptions, OTCs, herbal therapies and supplements) and documented in the medical record.    HPI  1.  MCR  Mammogram: last done 4/2024.   --hx of left sided breast cancer (dx 2021) in the nipple s/p lumpectomy and radiation therapy.   Colonoscopy: last done 3/2018 and does not need repeated per GI  Immunizations: needs shingles vaccine  Needs DEXA scan     2. Pre-diabetic   Prior blood work was notable for elevated fasting glucose but without an abnormal hemoglobin A1c in the past. She manages with lifestyle modifications. Le will occasionally check her glucose at home which is usually below 90.    3. Hypertension  Blood pressure today on intake was 128/80  Current antihypertensive regimen is w/ amlodipine and metoprolol   Denies any chest pain or shortness of breath.   Le follows with Dr. Power ongoing.  CT coronary from 2022 had a total score of 4    4. Allergic rhinitis  Reports a chronically runny nose for which she takes zyrtec daily for along with flonase. However, this regimen only somewhat improves her symptoms. She has tried Azelastin in the past which caused her a bloody nose and so she discontinued the medication. Occasionally Le will use a nasal saline spray.     5. Left sided lower back pain w/ leg spasming at night   Le will occasionally get a deep aching pain in her left mid-thigh that is worse after exerting herself during the day. The pain will usually come on at night though she does not believe her symptoms are consistent with RLS. Le has tried multiple medications in the past including gabapentin which did not improve her symptoms. 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 She has an up-coming appointment with Dr. Murillo at [facility] scheduled.     6. Hyperlipidemia  Last lipid panel was completed one year prior  and was remarkable for an elevated cholesterol (205) and LDL (119). CT coronary from 2022 was notable for a total score of 4 therefore pt is not on a statin.      Review of Systems  All pertinent positive symptoms are included in the history of present illness.    All other systems have been reviewed and are negative and noncontributory to this patient's current ailments.    Past Medical History:   Diagnosis Date    Breast cancer (Multi) 2021    lt side    COVID-19 12/17/2020    COVID-19 virus infection    Encounter for other preprocedural examination 03/05/2021    Preop examination    Other conditions influencing health status 12/02/2020    History of cough    Personal history of malignant neoplasm of breast     History of malignant neoplasm of breast    Personal history of other diseases of the circulatory system     History of hypertension    Personal history of other diseases of the musculoskeletal system and connective tissue 09/21/2017    History of bone disorder    Personal history of other diseases of the nervous system and sense organs 03/05/2021    History of otitis media    Personal history of other diseases of the respiratory system     History of pneumothorax    Personal history of other specified conditions 02/23/2018    History of abdominal pain    Personal history of other specified conditions     History of chest pain     Past Surgical History:   Procedure Laterality Date    BREAST BIOPSY Right 2008    bgn    BREAST LUMPECTOMY  09/25/2017    Breast Surgery Lumpectomy    CARPAL TUNNEL RELEASE  09/25/2017    Neuroplasty Decompression Median Nerve At Carpal Tunnel    CATARACT EXTRACTION  09/25/2017    Cataract Extraction    CERVICAL FUSION  09/25/2017    Cervical Vertebral Fusion    HAND TENDON SURGERY  09/25/2017    Hand  Incision Tendon Sheath Of A Finger    HYSTERECTOMY  09/25/2017    Hysterectomy    KNEE ARTHROSCOPY W/ DEBRIDEMENT  09/25/2017    Arthroscopy Knee Right    LUMBAR LAMINECTOMY  09/25/2017    Laminectomy Lumbar    OTHER SURGICAL HISTORY  09/25/2017    Oophorectomy - Bilateral (Removal Of Both Ovaries)    OTHER SURGICAL HISTORY  05/13/2021    Knee replacement    TYMPANOSTOMY TUBE PLACEMENT  09/25/2017    Ear Pressure Equalization Tube     Social History     Tobacco Use    Smoking status: Never    Smokeless tobacco: Never   Substance Use Topics    Alcohol use: Never    Drug use: Never     Family History   Adopted: Yes   Problem Relation Name Age of Onset    No Known Problems Mother          adopted    No Known Problems Father          adopted     Immunization History   Administered Date(s) Administered    Influenza, Unspecified 09/26/2013    Moderna SARS-CoV-2 Vaccination 11/23/2021, 12/28/2021    Pneumococcal conjugate vaccine, 13-valent (PREVNAR 13) 09/24/2015    Pneumococcal polysaccharide vaccine, 23-valent, age 2 years and older (PNEUMOVAX 23) 02/05/2012, 02/23/2012    Zoster, live 07/15/2014     Current Outpatient Medications   Medication Instructions    amLODIPine (NORVASC) 5 mg, oral, Daily    azelastine (Astelin) 137 mcg (0.1 %) nasal spray Use 2 sprays in each nostril twice daily    azithromycin (Zithromax) 250 mg tablet Take 2 tabs (500 mg) by mouth today, than 1 daily for 4 days.    cetirizine HCl (ZYRTEC ORAL) ZyrTE Allergy    EPINEPHrine 0.3 mg/0.3 mL injection syringe INJECT AS DIRECTED FOR ANAPHYLAXIS (AND CALL 911 IF OCCURS)    fluticasone (Flonase) 50 mcg/actuation nasal spray 1 spray, Each Nostril, 2 times daily, Shake gently. Before first use, prime pump. After use, clean tip and replace cap.    meclizine (ANTIVERT) 25 mg, oral, 3 times daily PRN    methylPREDNISolone (Medrol Dospak) 4 mg tablets Take as directed on package.    metoprolol succinate XL (TOPROL-XL) 50 mg, oral, Daily    multivitamin  "tablet 1 tablet, Nightly    omeprazole (PRILOSEC) 40 mg, oral, Daily    prednisoLONE acetate (Pred-Forte) 1 % ophthalmic suspension 1 drop, Daily RT    predniSONE (Deltasone) 10 mg tablet Please take 3 tabs (30 mg) PO x 2 days, followed by 2 tabs (20 mg) x2 days. 1 tab (10 mg) x2 days, then 1/2 tab (5 mg) x 2 days, then stop     Allergies   Allergen Reactions    Azithromycin Unknown, Anaphylaxis, Diarrhea and Other    Clindamycin Unknown, Anaphylaxis, Other and Itching    Fish Containing Products Anaphylaxis    Nut - Unspecified Anaphylaxis    Penicillins Anaphylaxis and Unknown    Tetracyclines Anaphylaxis, Swelling and Unknown     Severe facial  And tongue swelling    Tree Nuts Anaphylaxis and Unknown    Simvastatin Other and Unknown     Severe muscle pain    Sulfamethoxazole-Trimethoprim Other    Clarithromycin Rash, Unknown, Swelling and Other     rash       Objective   Vitals:    01/03/25 1242   BP: 128/80   Pulse: 81   SpO2: 95%   Weight: 86.2 kg (190 lb)   Height: 1.6 m (5' 3\")     Body mass index is 33.66 kg/m².    BP Readings from Last 3 Encounters:   01/03/25 128/80   11/16/24 156/89   11/08/24 155/77      Wt Readings from Last 3 Encounters:   01/03/25 86.2 kg (190 lb)   11/16/24 85.3 kg (188 lb)   11/08/24 88.5 kg (195 lb)        No visits with results within 1 Month(s) from this visit.   Latest known visit with results is:   Admission on 11/16/2024, Discharged on 11/16/2024   Component Date Value    WBC 11/16/2024 5.8     nRBC 11/16/2024 0.0     RBC 11/16/2024 5.04     Hemoglobin 11/16/2024 14.5     Hematocrit 11/16/2024 42.5     MCV 11/16/2024 84     MCH 11/16/2024 28.8     MCHC 11/16/2024 34.1     RDW 11/16/2024 12.3     Platelets 11/16/2024 180     Neutrophils % 11/16/2024 54.1     Immature Granulocytes %,* 11/16/2024 0.2     Lymphocytes % 11/16/2024 31.3     Monocytes % 11/16/2024 10.9     Eosinophils % 11/16/2024 2.6     Basophils % 11/16/2024 0.9     Neutrophils Absolute 11/16/2024 3.13     " Immature Granulocytes Ab* 11/16/2024 0.01     Lymphocytes Absolute 11/16/2024 1.81     Monocytes Absolute 11/16/2024 0.63     Eosinophils Absolute 11/16/2024 0.15     Basophils Absolute 11/16/2024 0.05     Magnesium 11/16/2024 1.81     Glucose 11/16/2024 119 (H)     Sodium 11/16/2024 130 (L)     Potassium 11/16/2024 3.5     Chloride 11/16/2024 93 (L)     Bicarbonate 11/16/2024 27     Anion Gap 11/16/2024 14     Urea Nitrogen 11/16/2024 16     Creatinine 11/16/2024 0.69     eGFR 11/16/2024 88     Calcium 11/16/2024 9.1     Albumin 11/16/2024 4.2     Alkaline Phosphatase 11/16/2024 72     Total Protein 11/16/2024 6.8     AST 11/16/2024 16     Bilirubin, Total 11/16/2024 0.6     ALT 11/16/2024 12     Lactate 11/16/2024 1.3     BNP 11/16/2024 38     Coronavirus 2019, PCR 11/16/2024 Not Detected     Flu A Result 11/16/2024 Not Detected     Flu B Result 11/16/2024 Not Detected     POCT pH, Venous 11/16/2024 7.41     POCT pCO2, Venous 11/16/2024 50     POCT pO2, Venous 11/16/2024 30 (L)     POCT SO2, Venous 11/16/2024 47     POCT Oxy Hemoglobin, Tim* 11/16/2024 45.5     POCT Hematocrit Calculat* 11/16/2024 41.0     POCT Sodium, Venous 11/16/2024 131 (L)     POCT Potassium, Venous 11/16/2024 4.2     POCT Chloride, Venous 11/16/2024 98     POCT Ionized Calicum, Ve* 11/16/2024 1.21     POCT Glucose, Venous 11/16/2024 117 (H)     POCT Lactate, Venous 11/16/2024 1.6     POCT Base Excess, Venous 11/16/2024 5.8 (H)     POCT HCO3 Calculated, Ve* 11/16/2024 31.7 (H)     POCT Hemoglobin, Venous 11/16/2024 13.8     POCT Anion Gap, Venous 11/16/2024 6.0 (L)     Patient Temperature 11/16/2024      FiO2 11/16/2024 21     Ventricular Rate 11/16/2024 76     Atrial Rate 11/16/2024 76     MO Interval 11/16/2024 204     QRS Duration 11/16/2024 74     QT Interval 11/16/2024 376     QTC Calculation([place]azett) 11/16/2024 423     P Axis 11/16/2024 65     R Axis 11/16/2024 50     T [place] 11/16/2024 29     QRS Count 11/16/2024 12     Q Onset  11/16/2024 228     P Onset 11/16/2024 126     P Offset 11/16/2024 173     T Offset 11/16/2024 416     QTC Fredericia 11/16/2024 406     Troponin I, High Sensiti* 11/16/2024 4     Color, Urine 11/16/2024 Light-Yellow     Appearance, Urine 11/16/2024 Clear     Specific Gravity, Urine 11/16/2024 1.013     pH, Urine 11/16/2024 6.0     Protein, Urine 11/16/2024 NEGATIVE     Glucose, Urine 11/16/2024 Normal     Blood, Urine 11/16/2024 NEGATIVE     Ketones, Urine 11/16/2024 NEGATIVE     Bilirubin, Urine 11/16/2024 NEGATIVE     Urobilinogen, Urine 11/16/2024 Normal     Nitrite, Urine 11/16/2024 NEGATIVE     Leukocyte Esterase, Urine 11/16/2024 NEGATIVE     Extra Tube 11/16/2024 Hold for add-ons.     Troponin I, High Sensiti* 11/16/2024 4      Physical Exam  CONSTITUTIONAL - well nourished, well developed, looks like stated age, in no acute distress, not ill-appearing, and not tired appearing  SKIN - normal skin color and pigmentation, normal skin turgor without rash, lesions, or nodules visualized  HEAD - no trauma, normocephalic  EYES - pupils are equal and reactive to light, extraocular muscles are intact, and normal external exam  CHEST - clear to auscultation, no wheezing, no crackles and no rales, good effort  CARDIAC - regular rate and regular rhythm, no skipped beats, no murmur  EXTREMITIES - no obvious or evident edema, no obvious or evident deformities  NEUROLOGICAL - normal gait, normal balance, normal motor, no ataxia, DTRs equal and symmetrical; alert, oriented and no focal signs  PSYCHIATRIC - alert, pleasant and cordial, age-appropriate  IMMUNOLOGIC - no cervical lymphadenopathy    Assessment/Plan   Problem List Items Addressed This Visit    None    MCR  UTD on colonoscopy.   Ordered repeat mammogram for in 3 months   Annual blood work already completed and reviewed    Patient will call when in need of refills     2.  Pre diabetes  Glucose is currently well controlled  Will repeat CMP in 6 months to observe  for an elevated fasting glucose     3. Hyperlipidemia   CT coronary calcium score from 2022 w/ score of 4  Pt currently managing with lifestyle modifications   Plan to repeat lipid panel in 6 months     4. Allergic rhinitis  Continue to use OTC antihistamines and flonase  as needed     5. Left sided sciatica and low back pain  Appointment scheduled with Dr. Murillo upcoming    6. Hypertension  Continue amlodipine and metoprolol as prescribed   Repeat CMP in 6 months     Follow-up in 6 months  Carlos Ivan DO  PGY  Family Medicine

## 2025-01-05 DIAGNOSIS — I10 HYPERTENSION, UNSPECIFIED TYPE: ICD-10-CM

## 2025-01-06 RX ORDER — AMLODIPINE BESYLATE 5 MG/1
5 TABLET ORAL DAILY
Qty: 90 TABLET | Refills: 1 | Status: SHIPPED | OUTPATIENT
Start: 2025-01-06

## 2025-01-08 ENCOUNTER — APPOINTMENT (OUTPATIENT)
Dept: PRIMARY CARE | Facility: CLINIC | Age: 80
End: 2025-01-08
Payer: MEDICARE

## 2025-02-10 ENCOUNTER — TELEMEDICINE (OUTPATIENT)
Dept: PRIMARY CARE | Facility: CLINIC | Age: 80
End: 2025-02-10
Payer: MEDICARE

## 2025-02-10 DIAGNOSIS — T78.40XD ALLERGY, SUBSEQUENT ENCOUNTER: Primary | ICD-10-CM

## 2025-02-10 RX ORDER — METHYLPREDNISOLONE 4 MG/1
TABLET ORAL
Qty: 21 TABLET | Refills: 0 | Status: SHIPPED | OUTPATIENT
Start: 2025-02-10 | End: 2025-02-16

## 2025-02-10 NOTE — PROGRESS NOTES
Subjective   Patient ID: Le Granados is a 79 y.o. female who presents for No chief complaint on file..    HPI   She is 79 year old female who called due to not feeling well.    She has been feeling dryness and swollen of nasal mucosa, tightness with breathing for the past 3-4 days.   Patient denied sore throat, nausea, vomiting, abdominal pain, diarrhea, fever, runny nose, cough, rash.   Patient does not smoke.   She did not have sick contact.   She was exposed to dust and she believes that her symptoms are related to that.     Review of Systems  .  All pertinent positive symptoms are included in the history of present illness.    All other systems have been reviewed and are negative and noncontributory to this patient's current ailments.  Objective   There were no vitals taken for this visit.    Physical Exam  Telephone visit  General: Alert and oriented. Appears well-nourished and in no acute distress, able to provide history.  Head/neck: Normocephalic.   Respiratory/Thorax: Speak full sentences, no conversational dyspnea  Psychological: Appropriate mood and affect.   Skin: No visible rashes or lesions.    Assessment/Plan   Diagnoses and all orders for this visit:  Allergy, subsequent encounter  -     methylPREDNISolone (Medrol Dospak) 4 mg tablets; Take as directed on package.  -     sodium chloride ([place]) 0.65 % nasal spray; Administer 1 spray into each nostril if needed for congestion.  - please let us know if the symptoms get worse or close to the closest ED .     Multiple allergy to the medication, antibiotic  -     Referral to Allergy; Future    Patient agreed to the plan.     I discussed my plan with my attending, Dr. Lazo.     Domenica Shelby. MD  Family medicine resident  [id number]

## 2025-02-24 ENCOUNTER — APPOINTMENT (OUTPATIENT)
Dept: PRIMARY CARE | Facility: CLINIC | Age: 80
End: 2025-02-24
Payer: MEDICARE

## 2025-02-24 VITALS
HEIGHT: 63 IN | BODY MASS INDEX: 33.66 KG/M2 | OXYGEN SATURATION: 97 % | DIASTOLIC BLOOD PRESSURE: 74 MMHG | HEART RATE: 72 BPM | WEIGHT: 190 LBS | SYSTOLIC BLOOD PRESSURE: 126 MMHG

## 2025-02-24 DIAGNOSIS — Z01.818 PREOPERATIVE CLEARANCE: ICD-10-CM

## 2025-02-24 DIAGNOSIS — M47.816 SPONDYLOSIS OF LUMBAR SPINE: Primary | ICD-10-CM

## 2025-02-24 PROCEDURE — 1159F MED LIST DOCD IN RCRD: CPT

## 2025-02-24 PROCEDURE — 3078F DIAST BP <80 MM HG: CPT

## 2025-02-24 PROCEDURE — 3074F SYST BP LT 130 MM HG: CPT

## 2025-02-24 PROCEDURE — 99213 OFFICE O/P EST LOW 20 MIN: CPT

## 2025-02-24 PROCEDURE — 1036F TOBACCO NON-USER: CPT

## 2025-02-24 ASSESSMENT — PATIENT HEALTH QUESTIONNAIRE - PHQ9
SUM OF ALL RESPONSES TO PHQ9 QUESTIONS 1 AND 2: 0
1. LITTLE INTEREST OR PLEASURE IN DOING THINGS: NOT AT ALL
2. FEELING DOWN, DEPRESSED OR HOPELESS: NOT AT ALL

## 2025-02-24 NOTE — PROGRESS NOTES
Family Medicine Clinic Note    Today's Date: 2/24/2025       HPI: Le Granados is a 79 y.o. female with PMH of multiple allergies to antibiotics, anaphylaxis, breast cancer, systemic mastocytosis, HTN, HLD, SNHL with hearing aids, s/p tympanostomy tube placement, age related osteoporosis, dysmetabolic syndrome X, GERD, and lung nodule who presents today for multilevel spondylosis most prominent at L2-L3.     -Pt has L2-L3 decompression scheduled on March 10, 2025 with Dr. Murillo   -Pt reports chronic back pain and states that she has a pinched nerve in her low back, which gives her spasms in her left leg and causes numbness  -Denies fevers, chills, headache, dizziness, chest pain, palpitations, SOB, cough, wheezing, abdominal pain, nausea, vomiting, diarrhea.  -Pt does endorse mild nasal congestion and states that she uses Flonase. Denies rhinorrhea.   -CMP and lipid panel has already been ordered. Pt brings preoperative paperwork with her and requests we order CBC as requested by her surgeon.      Review of Systems: 10 point review of systems negative and noncontributory unless listed in HPI above.     Past Medical History:  Past Medical History:   Diagnosis Date    Breast cancer (Multi) 2021    lt side    COVID-19 12/17/2020    COVID-19 virus infection    Encounter for other preprocedural examination 03/05/2021    Preop examination    Other conditions influencing health status 12/02/2020    History of cough    Personal history of malignant neoplasm of breast     History of malignant neoplasm of breast    Personal history of other diseases of the circulatory system     History of hypertension    Personal history of other diseases of the musculoskeletal system and connective tissue 09/21/2017    History of bone disorder    Personal history of other diseases of the nervous system and sense organs 03/05/2021    History of otitis media    Personal history of other diseases of the respiratory system     History of  pneumothorax    Personal history of other specified conditions 02/23/2018    History of abdominal pain    Personal history of other specified conditions     History of chest pain        Past Surgical History:   Past Surgical History:   Procedure Laterality Date    BREAST BIOPSY Right 2008    bgn    BREAST LUMPECTOMY  09/25/2017    Breast Surgery Lumpectomy    CARPAL TUNNEL RELEASE  09/25/2017    Neuroplasty Decompression Median Nerve At Carpal Tunnel    CATARACT EXTRACTION  09/25/2017    Cataract Extraction    CERVICAL FUSION  09/25/2017    Cervical Vertebral Fusion    HAND TENDON SURGERY  09/25/2017    Hand Incision Tendon Sheath Of A Finger    HYSTERECTOMY  09/25/2017    Hysterectomy    KNEE ARTHROSCOPY W/ DEBRIDEMENT  09/25/2017    Arthroscopy Knee Right    LUMBAR LAMINECTOMY  09/25/2017    Laminectomy Lumbar    OTHER SURGICAL HISTORY  09/25/2017    Oophorectomy - Bilateral (Removal Of Both Ovaries)    OTHER SURGICAL HISTORY  05/13/2021    Knee replacement    TYMPANOSTOMY TUBE PLACEMENT  09/25/2017    Ear Pressure Equalization Tube        Family History:  Family History   Adopted: Yes   Problem Relation Name Age of Onset    No Known Problems Mother          adopted    No Known Problems Father          adopted        Social History:  Social History     Socioeconomic History    Marital status:      Spouse name: Not on file    Number of children: Not on file    Years of education: Not on file    Highest education level: Not on file   Occupational History    Not on file   Tobacco Use    Smoking status: Never    Smokeless tobacco: Never   Substance and Sexual Activity    Alcohol use: Never    Drug use: Never    Sexual activity: Not on file   Other Topics Concern    Not on file   Social History Narrative    Not on file     Social Drivers of Health     Financial Resource Strain: Low Risk  (7/7/2024)    Overall Financial Resource Strain (CARDIA)     Difficulty of Paying Living Expenses: Not hard at all   Food  Insecurity: No Food Insecurity (7/6/2024)    Hunger Vital Sign     Worried About Running Out of Food in the Last Year: Never true     Ran Out of Food in the Last Year: Never true   Transportation Needs: No Transportation Needs (7/7/2024)    PRAPARE - Transportation     Lack of Transportation (Medical): No     Lack of Transportation (Non-Medical): No   Physical Activity: Sufficiently Active (7/6/2024)    Exercise Vital Sign     Days of Exercise per Week: 5 days     Minutes of Exercise per Session: 140 min   Stress: No Stress Concern Present (7/6/2024)    [employer] [place] [employer] - Occupational Stress Questionnaire     Feeling of Stress : Not at all   Social Connections: Socially Integrated (7/6/2024)    Social Connection and Isolation Panel [NHANES]     Frequency of Communication with Friends and Family: More than three times a week     Frequency of Social Gatherings with Friends and Family: Three times a week     Attends Voodoo Services: 1 to 4 times per year     Active Member of Clubs or Organizations: Yes     Attends Club or Organization Meetings: More than 4 times per year     Marital Status:    Intimate Partner Violence: Not At Risk (7/6/2024)    Humiliation, Afraid, Rape, and Kick questionnaire     Fear of Current or Ex-Partner: No     Emotionally Abused: No     Physically Abused: No     Sexually Abused: No   Housing Stability: Low Risk  (7/7/2024)    Housing Stability Vital Sign     Unable to Pay for Housing in the Last Year: No     Number of Places Lived in the Last Year: 1     Unstable Housing in the Last Year: No       Current Medications:     Current Outpatient Medications:     amLODIPine (Norvasc) 5 mg tablet, TAKE 1 TABLET BY MOUTH EVERY DAY, Disp: 90 tablet, Rfl: 1    cetirizine HCl (ZYRTEC ORAL), ZyrTEC Allergy, Disp: , Rfl:     EPINEPHrine 0.3 mg/0.3 mL injection syringe, INJECT AS DIRECTED FOR ANAPHYLAXIS (AND CALL 911 IF OCCURS), Disp: 2 each, Rfl: 1    fluticasone  "(Flonase) 50 mcg/actuation nasal spray, Administer 1 spray into each nostril 2 times a day. Shake gently. Before first use, prime pump. After use, clean tip and replace cap., Disp: 16 g, Rfl: 0    meclizine (Antivert) 25 mg tablet, TAKE 1 TABLET (25 MG) BY MOUTH 3 TIMES A DAY AS NEEDED FOR DIZZINESS., Disp: 30 tablet, Rfl: 0    metoprolol succinate XL (Toprol-XL) 50 mg 24 hr tablet, TAKE 1 TABLET BY MOUTH EVERY DAY, Disp: 90 tablet, Rfl: 1    multivitamin tablet, Take 1 tablet by mouth once daily at bedtime., Disp: , Rfl:     omeprazole (PriLOSEC) 40 mg DR capsule, TAKE 1 CAPSULE BY MOUTH EVERY DAY, Disp: 90 capsule, Rfl: 1    prednisoLONE acetate (Pred-Forte) 1 % ophthalmic suspension, Administer 1 drop into affected eye(s) once daily., Disp: , Rfl:     sodium chloride (Ocean) 0.65 % nasal spray, Administer 1 spray into each nostril if needed for congestion., Disp: 30 mL, Rfl: 12    Allergies:   Allergies   Allergen Reactions    Azithromycin Unknown, Anaphylaxis, Diarrhea and Other    Clindamycin Unknown, Anaphylaxis, Other and Itching    Fish Containing Products Anaphylaxis    Nut - Unspecified Anaphylaxis    Penicillins Anaphylaxis and Unknown    Tetracyclines Anaphylaxis, Swelling and Unknown     Severe facial  And tongue swelling    Tree Nuts Anaphylaxis and Unknown    Simvastatin Other and Unknown     Severe muscle pain    Sulfamethoxazole-Trimethoprim Other    Clarithromycin Rash, Unknown, Swelling and Other     rash        Objective:  Vitals: Blood pressure 126/74, pulse 72, height 1.6 m (5' 3\"), weight 86.2 kg (190 lb), SpO2 97%.  Body mass index is 33.66 kg/m².     Physical Exam:  GENERAL: Alert, oriented, well nourished, age-appropriate, pleasant, in no acute distress.   HEENT: Head normocephalic, atraumatic. EOMI, PERRL. Normal nasal septum and turbinates. No rhinorrhea or congestion noted. Mucous membranes moist, no mucosal lesions of the mouth. No obvious dental caries. Pharynx without tonsillar " swelling or exudate. No cervical lymphadenopathy. Thyroid non-enlarged and non-tender. Hearing aids in place bilaterally.   CARDIOVASCULAR: Heart with regular rate and rhythm, normal S1/S2, no murmurs; normal peripheral pulses- radial and dorsalis pedis palpable.  LUNGS: Clear to auscultation bilaterally without wheezing, rhonchi or rales; good respiratory effort, no increased work of breathing.  ABDOMEN: Soft, non-tender, non-distended, no masses appreciated. No guarding or rebound. Normoactive bowel sounds in all 4 quadrants.   EXTREMITIES: No deformities, cyanosis, or edema bilaterally in upper and lower extremities.   NEURO: Alert and oriented to person, place, and time. No focal deficits.   PSYCH: Appropriate mood and affect.  SKIN: No rashes or lesions appreciated    Assessment/Plan   Diagnoses and all orders for this visit:  Lumbar spondylosis   Preoperative clearance  -     CBC; Future  - plans to undergo L2-L3 decompression   -Pt brings preoperative clearance paperwork with her to clinic. We will fill it out and fax it to Dr. Murillo's office, along with the visit note and lab results.     -Revised Cardiac Risk Index for Pre-Operative Risk from [employer]  on 2/24/2025  ** All calculations should be rechecked by clinician prior to use **    RESULT SUMMARY:  0 points  RCRI Score    3.9 %  Risk of major cardiac event      INPUTS:  High-risk surgery --> 0 = No  History of ischemic heart disease --> 0 = No  History of congestive heart failure --> 0 = No  History of cerebrovascular disease --> 0 = No  Pre-operative treatment with insulin --> 0 = No  Pre-operative creatinine >2 mg/dL / 176.8 µmol/L --> 0 = No       All questions answered. Patient understands and agrees to the plan. Case discussed with Dr. Talbot.    Mariela Meehan, DO  PGY-1, Family Medicine  [facility]

## 2025-02-27 LAB
ERYTHROCYTE [DISTWIDTH] IN BLOOD BY AUTOMATED COUNT: 12.6 % (ref 11–15)
HCT VFR BLD AUTO: 42.3 % (ref 35–45)
HGB BLD-MCNC: 14 G/DL (ref 11.7–15.5)
MCH RBC QN AUTO: 29 PG (ref 27–33)
MCHC RBC AUTO-ENTMCNC: 33.1 G/DL (ref 32–36)
MCV RBC AUTO: 87.8 FL (ref 80–100)
PLATELET # BLD AUTO: 170 THOUSAND/UL (ref 140–400)
PMV BLD REES-ECKER: 12.5 FL (ref 7.5–12.5)
RBC # BLD AUTO: 4.82 MILLION/UL (ref 3.8–5.1)
WBC # BLD AUTO: 4.5 THOUSAND/UL (ref 3.8–10.8)

## 2025-02-28 ENCOUNTER — HOSPITAL ENCOUNTER (OUTPATIENT)
Dept: RADIATION ONCOLOGY | Facility: CLINIC | Age: 80
Setting detail: RADIATION/ONCOLOGY SERIES
Discharge: HOME | End: 2025-02-28
Payer: MEDICARE

## 2025-02-28 VITALS
HEART RATE: 74 BPM | OXYGEN SATURATION: 97 % | DIASTOLIC BLOOD PRESSURE: 77 MMHG | TEMPERATURE: 97 F | BODY MASS INDEX: 33.87 KG/M2 | SYSTOLIC BLOOD PRESSURE: 124 MMHG | RESPIRATION RATE: 18 BRPM | WEIGHT: 191.2 LBS

## 2025-02-28 DIAGNOSIS — J30.2 SEASONAL ALLERGIES: ICD-10-CM

## 2025-02-28 DIAGNOSIS — D05.12 DUCTAL CARCINOMA IN SITU (DCIS) OF LEFT BREAST: ICD-10-CM

## 2025-02-28 LAB
ALBUMIN SERPL-MCNC: 4.4 G/DL (ref 3.6–5.1)
ALBUMIN/GLOB SERPL: 1.9 (CALC) (ref 1–2.5)
ALP SERPL-CCNC: 64 U/L (ref 37–153)
ALT SERPL-CCNC: 11 U/L (ref 6–29)
AST SERPL-CCNC: 16 U/L (ref 10–35)
BILIRUB SERPL-MCNC: 0.7 MG/DL (ref 0.2–1.2)
BUN SERPL-MCNC: 13 MG/DL (ref 7–25)
BUN/CREAT SERPL: NORMAL (CALC) (ref 6–22)
CALCIUM SERPL-MCNC: 9.7 MG/DL (ref 8.6–10.4)
CHLORIDE SERPL-SCNC: 100 MMOL/L (ref 98–110)
CHOLEST SERPL-MCNC: 227 MG/DL
CHOLEST/HDLC SERPL: 2.8 (CALC)
CO2 SERPL-SCNC: 29 MMOL/L (ref 20–32)
CREAT SERPL-MCNC: 0.69 MG/DL (ref 0.6–1)
EGFRCR SERPLBLD CKD-EPI 2021: 88 ML/MIN/1.73M2
GLOBULIN SER CALC-MCNC: 2.3 G/DL (CALC) (ref 1.9–3.7)
GLUCOSE SERPL-MCNC: 93 MG/DL (ref 65–99)
HDLC SERPL-MCNC: 82 MG/DL
LDLC SERPL CALC-MCNC: 128 MG/DL (CALC)
NONHDLC SERPL-MCNC: 145 MG/DL (CALC)
POTASSIUM SERPL-SCNC: 4.6 MMOL/L (ref 3.5–5.3)
PROT SERPL-MCNC: 6.7 G/DL (ref 6.1–8.1)
SODIUM SERPL-SCNC: 139 MMOL/L (ref 135–146)
TRIGL SERPL-MCNC: 74 MG/DL

## 2025-02-28 PROCEDURE — 99213 OFFICE O/P EST LOW 20 MIN: CPT | Performed by: NURSE PRACTITIONER

## 2025-02-28 RX ORDER — FLUTICASONE PROPIONATE 50 MCG
1 SPRAY, SUSPENSION (ML) NASAL 2 TIMES DAILY
Qty: 16 G | Refills: 0 | Status: SHIPPED | OUTPATIENT
Start: 2025-02-28 | End: 2026-02-28

## 2025-02-28 ASSESSMENT — PAIN SCALES - GENERAL: PAINLEVEL_OUTOF10: 6

## 2025-02-28 NOTE — PROGRESS NOTES
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 ER/ME+.     2021 through 2021: radiation therapy to the left breast consisting of a dose of 28.5 Gray/5.7 Gray per fraction/ given in 5 fractions. The radiation was delivered weekly.     Was recommended anastrozole post treatment however pt declined.      SUBJECTIVE  History of Present Illness:   Le Granados is here today for follow up s/p RT for left sided breast cancer. Doing well with no new health issues. She was recommended to start Arimidex. Patient decided against this due to side effects. Endorses some tenderness on the left breast. Denies difficulties with ROM or swelling of left arm. She follows her surgeon Dr. Parisi with mammograms at [facility]. Denies headaches, fever, chills, cough, SOB, chest pain, N/V or bony pain. She is having back surgery in March for a pinched nerve from previous back surgery she says.     Review of Systems:    Review of Systems   All other systems reviewed and are negative.    Performance Status:   The Karnofsky performance scale today is 100, Fully active, able to carry on all pre-disease performed without restriction (ECOG equivalent 0).      OBJECTIVE    Current Outpatient Medications:     amLODIPine (Norvasc) 5 mg tablet, TAKE 1 TABLET BY MOUTH EVERY DAY, Disp: 90 tablet, Rfl: 1    cetirizine HCl (ZYRTEC ORAL), ZyrTEC Allergy,  Disp: , Rfl:     EPINEPHrine 0.3 mg/0.3 mL injection syringe, INJECT AS DIRECTED FOR ANAPHYLAXIS (AND CALL 911 IF OCCURS), Disp: 2 each, Rfl: 1    fluticasone (Flonase) 50 mcg/actuation nasal spray, Administer 1 spray into each nostril 2 times a day. Shake gently. Before first use, prime pump. After use, clean tip and replace cap., Disp: 16 g, Rfl: 0    meclizine (Antivert) 25 mg tablet, TAKE 1 TABLET (25 MG) BY MOUTH 3 TIMES A DAY AS NEEDED FOR DIZZINESS., Disp: 30 tablet, Rfl: 0    metoprolol succinate XL (Toprol-XL) 50 mg 24 hr tablet, TAKE 1 TABLET BY MOUTH EVERY DAY, Disp: 90 tablet, Rfl: 1    multivitamin tablet, Take 1 tablet by mouth once daily at bedtime., Disp: , Rfl:     omeprazole (PriLOSEC) 40 mg DR capsule, TAKE 1 CAPSULE BY MOUTH EVERY DAY, Disp: 90 capsule, Rfl: 1    prednisoLONE acetate (Pred-Forte) 1 % ophthalmic suspension, Administer 1 drop into affected eye(s) once daily., Disp: , Rfl:     sodium chloride (Ocean) 0.65 % nasal spray, Administer 1 spray into each nostril if needed for congestion., Disp: 30 mL, Rfl: 12     Physical Exam  Constitutional:       Appearance: Normal appearance.   HENT:      Head: Normocephalic and atraumatic.      Nose: Nose normal.      Mouth/Throat:      Mouth: Mucous membranes are moist.      Pharynx: Oropharynx is clear.   Eyes:      Conjunctiva/sclera: Conjunctivae normal.      Pupils: Pupils are equal, round, and reactive to light.   Cardiovascular:      Rate and Rhythm: Normal rate and regular rhythm.      Heart sounds: Normal heart sounds.   Pulmonary:      Effort: Pulmonary effort is normal.   Chest:   Breasts:     Right: Normal. No swelling, inverted nipple, mass or nipple discharge.      Left: Normal. No swelling, inverted nipple, mass or nipple discharge.          Comments: Left breast with well healed surgical incision. Mild fibrotic skin changes medial portion of left breast.   Abdominal:      Palpations: Abdomen is soft.   Musculoskeletal:          General: No swelling. Normal range of motion.      Cervical back: Normal range of motion and neck supple.   Lymphadenopathy:      Cervical: No cervical adenopathy.      Upper Body:      Right upper body: No supraclavicular or axillary adenopathy.      Left upper body: No supraclavicular or axillary adenopathy.   Skin:     General: Skin is warm and dry.   Neurological:      General: No focal deficit present.      Mental Status: She is alert and oriented to person, place, and time.   Psychiatric:         Mood and Affect: Mood normal.         Behavior: Behavior normal.         RESULTS:  Narrative & Impression   Interpreted By:  Lou Oneill,   STUDY:  BI MAMMO BILATERAL DIAGNOSTIC TOMOSYNTHESIS;  4/10/2024 10:19 am      ACCESSION NUMBER(S):  [id number]      ORDERING CLINICIAN:  FRANKLIN CHEN      INDICATION:  Signs/Symptoms:abnormal silver, hx of breast cancer.      COMPARISON:  04/05/2023      FINDINGS:  MAMMOGRAPHY: 2D and tomosynthesis images were reviewed at 1 mm slice  thickness.      Density:  The breast tissue is extremely dense, which may limit the  sensitivity of mammography.      Postoperative and postradiation changes similar to prior. No  suspicious masses or calcifications are identified.      ULTRASOUND:  Targeted ultrasound of the right breast was performed in  the area of palpable lump. There is no evidence for cystic or solid  lesions identified. Benign features on elastography. No findings to  suggest breast carcinoma.      IMPRESSION:  No mammographic or sonographic evidence of malignancy.      BI-RADS CATEGORY:      BI-RADS Category:  2 Benign.  Recommendation:  Annual Screening.  Recommended Date:  1 Year.  Laterality:  Bilateral.     ASSESSMENT/PLAN:  79 y.o. female s/p radiation for breast cancer. Continue follow up with Dr. Chen at [facility] with mammograms. Patient to return to clinic in 12 months unless needs to be seen sooner. Instructed to call with any questions or concerns.     Vanessa  Gama CNP  [phone]

## 2025-03-24 DIAGNOSIS — T78.00XA ANAPHYLACTIC REACTION DUE TO UNSPECIFIED FOOD, INITIAL ENCOUNTER: ICD-10-CM

## 2025-03-24 RX ORDER — EPINEPHRINE 0.3 MG/.3ML
1 INJECTION SUBCUTANEOUS ONCE AS NEEDED
Qty: 2 EACH | Refills: 2 | Status: SHIPPED | OUTPATIENT
Start: 2025-03-24

## 2025-04-06 DIAGNOSIS — I10 ESSENTIAL (PRIMARY) HYPERTENSION: ICD-10-CM

## 2025-04-07 RX ORDER — METOPROLOL SUCCINATE 50 MG/1
50 TABLET, EXTENDED RELEASE ORAL DAILY
Qty: 90 TABLET | Refills: 1 | OUTPATIENT
Start: 2025-04-07

## 2025-04-15 ENCOUNTER — TELEPHONE (OUTPATIENT)
Facility: CLINIC | Age: 80
End: 2025-04-15
Payer: MEDICARE

## 2025-04-15 NOTE — TELEPHONE ENCOUNTER
Patient called said allergies are really bad.  Has taken Mucinex and Zyrtec.  Said it is making her short of breath.  She asked for a dosepak.

## 2025-04-17 ENCOUNTER — OFFICE VISIT (OUTPATIENT)
Facility: CLINIC | Age: 80
End: 2025-04-17
Payer: MEDICARE

## 2025-04-17 VITALS
OXYGEN SATURATION: 97 % | WEIGHT: 189 LBS | DIASTOLIC BLOOD PRESSURE: 64 MMHG | HEART RATE: 85 BPM | BODY MASS INDEX: 32.27 KG/M2 | HEIGHT: 64 IN | SYSTOLIC BLOOD PRESSURE: 122 MMHG

## 2025-04-17 DIAGNOSIS — J30.1 ALLERGIC RHINITIS DUE TO POLLEN, UNSPECIFIED SEASONALITY: Primary | ICD-10-CM

## 2025-04-17 ASSESSMENT — PATIENT HEALTH QUESTIONNAIRE - PHQ9
2. FEELING DOWN, DEPRESSED OR HOPELESS: NOT AT ALL
1. LITTLE INTEREST OR PLEASURE IN DOING THINGS: NOT AT ALL
SUM OF ALL RESPONSES TO PHQ9 QUESTIONS 1 AND 2: 0

## 2025-04-17 NOTE — PROGRESS NOTES
"Subjective   Patient ID: Le Granados is a 79 y.o. female who presents for Nasal Congestion (Allergies acting up , cleaning out her house to sell and a lot of dust ).    HPI   Patient was complaining about slightly dry cough with chest tightness, shortness of breath, with wheezing, postnasal dripping which has been going on for the past 10 days.   Shortness of breath was with exertion.   Her symptoms got better starting yesterday.   She has been using Mucinex and Zyrtec with nasal spray which helped her symptoms.   Patient denied fever, runny nose, lower leg swelling.   She believes that her symptoms are related to dust because she is trying to move things at home due to selling her house  NO infection or steroid use in the past 6 months.   No sick contact.   She has bad allergies to antibiotic, food, pollen.   Patient have a history of atelectasis on the left lower lobe diagnosed long time ago.  She have had different x-ray and CT scan which came back positive for atelectasis.     Chronic rhinitis  Allegra was given in the last visit but was complicated with a.fib.  This happened couple of weeks ago for which she saw dr. Power   No intervention needed at the time.     Back surgery 1 month ago.     Review of Systems  All pertinent positive symptoms are included in the history of present illness.    All other systems have been reviewed and are negative and noncontributory to this patient's current ailments.  Objective   /64   Pulse 85   Ht 1.626 m (5' 4\")   Wt 85.7 kg (189 lb)   SpO2 97%   BMI 32.44 kg/m²     Physical Exam  General: Alert and oriented. Appears well-nourished and in no acute distress.  Head/neck: Normocephalic. Supple.  No tenderness over sinuses  Respiratory/Thorax: Clear breath sound except for the left lower lobe which was positive for rales . no wheezing.   Cardiovascular: Regular rate but irregular rhythm.  Positive for murmurs.  Gastrointestinal: Soft, nontender, nondistended. +BS "   Musculoskeletal: ROM intact. No joint swelling. Normal strength   Extremities: Warm and well perfused. No peripheral edema.  Psychological: Appropriate mood and affect.   Skin: No visible rashes or lesions.    Assessment/Plan   She is 79-year-old female who came today to the office for respiratory symptoms chest tightness, wheezing, postnasal dripping which got better in the last couple of days.   She has been moving her stuff from house due to selling her house and attributing her symptoms to the dust.     Allergic rhinitis due to pollen.   Patient have tried azelastine in the past but have nosebleed  At this point she was advised to continue Zyrtec, Flonase and follow-up with allergy/immunology doctor   - Patient was advised to use a mask in this event.     I discussed my plan with my attending, Dr. Clifton Shelby. MD  Family medicine resident  PGY3

## 2025-04-23 DIAGNOSIS — T78.03XA ANAPHYLACTIC SHOCK DUE TO SEAFOOD, INITIAL ENCOUNTER: ICD-10-CM

## 2025-04-23 DIAGNOSIS — J30.89 ALLERGIC RHINITIS DUE TO OTHER ALLERGIC TRIGGER, UNSPECIFIED SEASONALITY: Primary | ICD-10-CM

## 2025-04-23 DIAGNOSIS — Z91.018 FOOD ALLERGY: Primary | ICD-10-CM

## 2025-04-23 DIAGNOSIS — T78.05XA ANAPHYLACTIC REACTION DUE TO TREE NUTS AND SEEDS, INITIAL ENCOUNTER: ICD-10-CM

## 2025-04-23 DIAGNOSIS — D89.49 OTHER MAST CELL ACTIVATION DISORDER: ICD-10-CM

## 2025-04-23 DIAGNOSIS — T78.01XA ANAPHYLACTIC REACTION DUE TO PEANUTS, INITIAL ENCOUNTER: ICD-10-CM

## 2025-05-02 LAB
A ALTERNATA IGE QN: <0.1 KU/L
A ALTERNATA IGE RAST: 0
A FUMIGATUS IGE QN: <0.1 KU/L
A FUMIGATUS IGE RAST: 0
ALMOND IGE QN: <0.1 KU/L
ALMOND IGE RAST: 0
BERMUDA GRASS IGE QN: <0.1 KU/L
BERMUDA GRASS IGE RAST: 0
BLUE MUSSEL IGE QN: <0.1 KU/L
BLUE MUSSEL IGE RAST: 0
BOXELDER IGE QN: <0.1 KU/L
BOXELDER IGE RAST: 0
BRAZIL NUT IGE QN: <0.1 KU/L
BRAZIL NUT IGE RAST: 0
C HERBARUM IGE QN: <0.1 KU/L
C HERBARUM IGE RAST: 0
CALIF WALNUT POLN IGE QN: <0.1 KU/L
CALIF WALNUT POLN IGE RAST: 0
CASHEW NUT IGE QN: <0.1 KU/L
CASHEW NUT IGE RAST: 0
CAT DANDER IGE QN: <0.1 KU/L
CAT DANDER IGE RAST: 0
CLAM IGE QN: <0.1 KU/L
CLAM IGE RAST: 0
CMN PIGWEED IGE QN: <0.1 KU/L
CMN PIGWEED IGE RAST: 0
CODFISH IGE QN: <0.1 KU/L
CODFISH IGE RAST: 0
COMMON RAGWEED IGE QN: <0.1 KU/L
COMMON RAGWEED IGE RAST: 0
COTTONWOOD IGE QN: <0.1 KU/L
COTTONWOOD IGE RAST: 0
CRAB IGE QN: <0.1 KU/L
CRAB IGE RAST: 0
D FARINAE IGE QN: <0.1 KU/L
D FARINAE IGE RAST: 0
D PTERONYSS IGE QN: 0.14 KU/L
D PTERONYSS IGE RAST: ABNORMAL
DOG DANDER IGE QN: <0.1 KU/L
DOG DANDER IGE RAST: 0
HAZELNUT IGE QN: <0.1 KU/L
HAZELNUT IGE RAST: 0
IGE SERPL-ACNC: 23 KU/L
LOBSTER IGE QN: <0.1 KU/L
LOBSTER IGE RAST: 0
LONDON PLANE IGE QN: <0.1 KU/L
LONDON PLANE IGE RAST: 0
MOUSE URINE PROT IGE QN: <0.1 KU/L
MOUSE URINE PROT IGE RAST: 0
MT JUNIPER IGE QN: <0.1 KU/L
MT JUNIPER IGE RAST: 0
OYSTER IGE QN: <0.1 KU/L
OYSTER IGE RAST: 0
P NOTATUM IGE QN: <0.1 KU/L
P NOTATUM IGE RAST: 0
PARSLEY IGE AB [PRESENCE] IN SERUM BY RADIOALLERGOSORBENT TEST (RAST): 0
PARSLEY IGE QN: <0.1 KU/L
PEANUT IGE QN: <0.1 KU/L
PEANUT IGE RAST: 0
PECAN/HICK NUT IGE QN: <0.1 KU/L
PECAN/HICK NUT IGE RAST: 0
PECAN/HICK TREE IGE QN: <0.1 KU/L
PECAN/HICK TREE IGE RAST: 0
PERCH IGE QN: NORMAL
PISTACHIO IGE QN: <0.1 KU/L
PISTACHIO IGE RAST: 0
QUEST CLASS F065 PERCH IGE: NORMAL
REF LAB TEST REF RANGE: NORMAL
ROACH IGE QN: <0.1 KU/L
ROACH IGE RAST: 0
SALMON IGE QN: <0.1 KU/L
SALMON IGE RAST: 0
SALTWORT IGE QN: <0.1 KU/L
SALTWORT IGE RAST: 0
SCALLOP IGE QN: <0.1 KU/L
SCALLOP IGE RAST: 0
SHEEP SORREL IGE QN: <0.1 KU/L
SHEEP SORREL IGE RAST: 0
SHRIMP IGE QN: <0.1 KU/L
SHRIMP IGE RAST: 0
SILVER BIRCH IGE QN: <0.1 KU/L
SILVER BIRCH IGE RAST: 0
THYME IGE AB [PRESENCE] IN SERUM BY RADIOALLERGOSORBENT TEST (RAST): NORMAL
THYME IGE QN: NORMAL
TILAPIA IGE QN: NORMAL
TILAPIA IGE RAST: NORMAL
TIMOTHY IGE QN: <0.1 KU/L
TIMOTHY IGE RAST: 0
TROUT IGE QN: <0.1 KU/L
TROUT IGE RAST: 0
TUNA IGE QN: <0.1 KU/L
TUNA IGE RAST: 0
WALNUT IGE QN: <0.1 KU/L
WALNUT IGE RAST: 0
WHITE ASH IGE QN: <0.1 KU/L
WHITE ASH IGE RAST: 0
WHITE ELM IGE QN: <0.1 KU/L
WHITE ELM IGE RAST: 0
WHITE MULBERRY IGE QN: <0.1 KU/L
WHITE MULBERRY IGE RAST: 0
WHITE OAK IGE QN: <0.1 KU/L
WHITE OAK IGE RAST: 0

## 2025-05-07 LAB
A ALTERNATA IGE QN: <0.1 KU/L
A ALTERNATA IGE RAST: 0
A FUMIGATUS IGE QN: <0.1 KU/L
A FUMIGATUS IGE RAST: 0
ALMOND IGE QN: <0.1 KU/L
ALMOND IGE RAST: 0
BERMUDA GRASS IGE QN: <0.1 KU/L
BERMUDA GRASS IGE RAST: 0
BLUE MUSSEL IGE QN: <0.1 KU/L
BLUE MUSSEL IGE RAST: 0
BOXELDER IGE QN: <0.1 KU/L
BOXELDER IGE RAST: 0
BRAZIL NUT IGE QN: <0.1 KU/L
BRAZIL NUT IGE RAST: 0
C HERBARUM IGE QN: <0.1 KU/L
C HERBARUM IGE RAST: 0
CALIF WALNUT POLN IGE QN: <0.1 KU/L
CALIF WALNUT POLN IGE RAST: 0
CASHEW NUT IGE QN: <0.1 KU/L
CASHEW NUT IGE RAST: 0
CAT DANDER IGE QN: <0.1 KU/L
CAT DANDER IGE RAST: 0
CLAM IGE QN: <0.1 KU/L
CLAM IGE RAST: 0
CMN PIGWEED IGE QN: <0.1 KU/L
CMN PIGWEED IGE RAST: 0
CODFISH IGE QN: <0.1 KU/L
CODFISH IGE RAST: 0
COMMON RAGWEED IGE QN: <0.1 KU/L
COMMON RAGWEED IGE RAST: 0
COTTONWOOD IGE QN: <0.1 KU/L
COTTONWOOD IGE RAST: 0
CRAB IGE QN: <0.1 KU/L
CRAB IGE RAST: 0
D FARINAE IGE QN: <0.1 KU/L
D FARINAE IGE RAST: 0
D PTERONYSS IGE QN: 0.14 KU/L
D PTERONYSS IGE RAST: ABNORMAL
DOG DANDER IGE QN: <0.1 KU/L
DOG DANDER IGE RAST: 0
HAZELNUT IGE QN: <0.1 KU/L
HAZELNUT IGE RAST: 0
IGE SERPL-ACNC: 23 KU/L
LOBSTER IGE QN: <0.1 KU/L
LOBSTER IGE RAST: 0
LONDON PLANE IGE QN: <0.1 KU/L
LONDON PLANE IGE RAST: 0
MOUSE URINE PROT IGE QN: <0.1 KU/L
MOUSE URINE PROT IGE RAST: 0
MT JUNIPER IGE QN: <0.1 KU/L
MT JUNIPER IGE RAST: 0
OYSTER IGE QN: <0.1 KU/L
OYSTER IGE RAST: 0
P NOTATUM IGE QN: <0.1 KU/L
P NOTATUM IGE RAST: 0
PARSLEY IGE AB [PRESENCE] IN SERUM BY RADIOALLERGOSORBENT TEST (RAST): 0
PARSLEY IGE QN: <0.1 KU/L
PEANUT IGE QN: <0.1 KU/L
PEANUT IGE RAST: 0
PECAN/HICK NUT IGE QN: <0.1 KU/L
PECAN/HICK NUT IGE RAST: 0
PECAN/HICK TREE IGE QN: <0.1 KU/L
PECAN/HICK TREE IGE RAST: 0
PERCH IGE QN: <0.35 KU/L
PISTACHIO IGE QN: <0.1 KU/L
PISTACHIO IGE RAST: 0
QUEST CLASS F065 PERCH IGE: 0
REF LAB TEST REF RANGE: NORMAL
ROACH IGE QN: <0.1 KU/L
ROACH IGE RAST: 0
SALMON IGE QN: <0.1 KU/L
SALMON IGE RAST: 0
SALTWORT IGE QN: <0.1 KU/L
SALTWORT IGE RAST: 0
SCALLOP IGE QN: <0.1 KU/L
SCALLOP IGE RAST: 0
SHEEP SORREL IGE QN: <0.1 KU/L
SHEEP SORREL IGE RAST: 0
SHRIMP IGE QN: <0.1 KU/L
SHRIMP IGE RAST: 0
SILVER BIRCH IGE QN: <0.1 KU/L
SILVER BIRCH IGE RAST: 0
THYME IGE AB [PRESENCE] IN SERUM BY RADIOALLERGOSORBENT TEST (RAST): 0
THYME IGE QN: <0.1 KU/L
TILAPIA IGE QN: <0.1 KU/L
TILAPIA IGE RAST: 0
TIMOTHY IGE QN: <0.1 KU/L
TIMOTHY IGE RAST: 0
TROUT IGE QN: <0.1 KU/L
TROUT IGE RAST: 0
TUNA IGE QN: <0.1 KU/L
TUNA IGE RAST: 0
WALNUT IGE QN: <0.1 KU/L
WALNUT IGE RAST: 0
WHITE ASH IGE QN: <0.1 KU/L
WHITE ASH IGE RAST: 0
WHITE ELM IGE QN: <0.1 KU/L
WHITE ELM IGE RAST: 0
WHITE MULBERRY IGE QN: <0.1 KU/L
WHITE MULBERRY IGE RAST: 0
WHITE OAK IGE QN: <0.1 KU/L
WHITE OAK IGE RAST: 0

## 2025-05-21 DIAGNOSIS — I10 ESSENTIAL (PRIMARY) HYPERTENSION: ICD-10-CM

## 2025-05-21 DIAGNOSIS — K21.9 GASTROESOPHAGEAL REFLUX DISEASE, UNSPECIFIED WHETHER ESOPHAGITIS PRESENT: ICD-10-CM

## 2025-05-21 RX ORDER — METOPROLOL SUCCINATE 50 MG/1
50 TABLET, EXTENDED RELEASE ORAL DAILY
Qty: 90 TABLET | Refills: 1 | Status: SHIPPED | OUTPATIENT
Start: 2025-05-21 | End: 2025-11-17

## 2025-05-21 RX ORDER — OMEPRAZOLE 40 MG/1
40 CAPSULE, DELAYED RELEASE ORAL DAILY
Qty: 90 CAPSULE | Refills: 1 | Status: SHIPPED | OUTPATIENT
Start: 2025-05-21 | End: 2025-11-17

## 2025-05-22 RX ORDER — METOPROLOL SUCCINATE 50 MG/1
50 TABLET, EXTENDED RELEASE ORAL DAILY
Qty: 90 TABLET | Refills: 1 | OUTPATIENT
Start: 2025-05-22

## 2025-05-22 RX ORDER — OMEPRAZOLE 40 MG/1
40 CAPSULE, DELAYED RELEASE ORAL DAILY
Qty: 90 CAPSULE | Refills: 1 | OUTPATIENT
Start: 2025-05-22

## 2025-05-29 ENCOUNTER — APPOINTMENT (OUTPATIENT)
Dept: AUDIOLOGY | Facility: CLINIC | Age: 80
End: 2025-05-29

## 2025-05-29 DIAGNOSIS — H90.3 SENSORINEURAL HEARING LOSS (SNHL) OF BOTH EARS: Primary | ICD-10-CM

## 2025-05-29 PROCEDURE — HRANC PR HEARING AID NO CHARGE: Performed by: AUDIOLOGIST

## 2025-05-29 PROCEDURE — V5014 HEARING AID REPAIR/MODIFYING: HCPCS | Performed by: AUDIOLOGIST

## 2025-05-29 NOTE — PROGRESS NOTES
"HEARING AID CHECK:    Patient fit 8/19/2021 with Phonak Audeo SHAUN hearing aids.      Right ear: Audeo P50-R  SN#  [id number]    Left ear: Audeo P50-R SN#  [id number]    Patient reports she has noticed decrease in hearing ability in left ear; Last audiogram performed 7/10/24 demonstrated decrease in hearing left ear; change in word discrimination.      Cleaned and checked hearing aids; vacuumed microphones/receivers; changed guards and domes.      Otoscopic demonstrated mild cerumen bilaterally; removed with alligators without incident.      Reprogrammed hearing aids today to current audiogram.   Patient reports there was an Echo sound of her voice.    Adjust \"loud speech\" setting.    Discussed adjustment to new setting with reprogramming.      Patient reports when phone rings it can be \"loud in ear\".    Lelia and I tried to adjust volume in settings; will automatically go louder.   Offered shutting off ring to hearing aid; patient would like to keep ring in ear at this time.      Recommendations:  1) Return for further adjustments if needed  2) Recheck hearing scheduled for October with seeing Richmond Mccarthy MD prior for cerumen removal.           *monitor LEFT ear    Patient was charged $50 for today's visit with reprogramming.      Ruthie Jiang M.A., CCC/A       "

## 2025-06-23 ENCOUNTER — APPOINTMENT (OUTPATIENT)
Dept: ALLERGY | Facility: CLINIC | Age: 80
End: 2025-06-23
Payer: MEDICARE

## 2025-07-29 DIAGNOSIS — I10 HYPERTENSION, UNSPECIFIED TYPE: ICD-10-CM

## 2025-07-31 RX ORDER — AMLODIPINE BESYLATE 5 MG/1
5 TABLET ORAL DAILY
Qty: 90 TABLET | Refills: 0 | Status: SHIPPED | OUTPATIENT
Start: 2025-07-31

## 2025-07-31 NOTE — TELEPHONE ENCOUNTER
90 day sent, Please schedule patient follow up in October as she will be due and will need her other refills around that same time

## 2025-08-19 LAB
ALBUMIN SERPL-MCNC: 4.2 G/DL (ref 3.6–5.1)
ALP SERPL-CCNC: 74 U/L (ref 37–153)
ALT SERPL-CCNC: 10 U/L (ref 6–29)
ANION GAP SERPL CALCULATED.4IONS-SCNC: 7 MMOL/L (CALC) (ref 7–17)
AST SERPL-CCNC: 17 U/L (ref 10–35)
BILIRUB SERPL-MCNC: 0.6 MG/DL (ref 0.2–1.2)
BUN SERPL-MCNC: 15 MG/DL (ref 7–25)
CALCIUM SERPL-MCNC: 9.3 MG/DL (ref 8.6–10.4)
CHLORIDE SERPL-SCNC: 100 MMOL/L (ref 98–110)
CHOLEST SERPL-MCNC: 185 MG/DL
CHOLEST/HDLC SERPL: 2.5 (CALC)
CO2 SERPL-SCNC: 29 MMOL/L (ref 20–32)
CREAT SERPL-MCNC: 0.61 MG/DL (ref 0.6–1)
EGFRCR SERPLBLD CKD-EPI 2021: 91 ML/MIN/1.73M2
GLUCOSE SERPL-MCNC: 94 MG/DL (ref 65–99)
HDLC SERPL-MCNC: 73 MG/DL
LDLC SERPL CALC-MCNC: 96 MG/DL (CALC)
NONHDLC SERPL-MCNC: 112 MG/DL (CALC)
POTASSIUM SERPL-SCNC: 4.2 MMOL/L (ref 3.5–5.3)
PROT SERPL-MCNC: 6.2 G/DL (ref 6.1–8.1)
SODIUM SERPL-SCNC: 136 MMOL/L (ref 135–146)
TRIGL SERPL-MCNC: 75 MG/DL

## 2025-09-05 DIAGNOSIS — J30.2 SEASONAL ALLERGIES: ICD-10-CM

## 2025-09-05 RX ORDER — FLUTICASONE PROPIONATE 50 MCG
1 SPRAY, SUSPENSION (ML) NASAL 2 TIMES DAILY
Qty: 16 G | Refills: 1 | Status: SHIPPED | OUTPATIENT
Start: 2025-09-05 | End: 2026-09-05

## 2025-10-08 ENCOUNTER — APPOINTMENT (OUTPATIENT)
Dept: OTOLARYNGOLOGY | Facility: CLINIC | Age: 80
End: 2025-10-08
Payer: MEDICARE

## 2025-10-08 ENCOUNTER — APPOINTMENT (OUTPATIENT)
Dept: AUDIOLOGY | Facility: CLINIC | Age: 80
End: 2025-10-08
Payer: MEDICARE